# Patient Record
Sex: MALE | Race: WHITE | NOT HISPANIC OR LATINO | URBAN - METROPOLITAN AREA
[De-identification: names, ages, dates, MRNs, and addresses within clinical notes are randomized per-mention and may not be internally consistent; named-entity substitution may affect disease eponyms.]

---

## 2017-01-20 ENCOUNTER — INPATIENT (INPATIENT)
Facility: HOSPITAL | Age: 72
LOS: 0 days | Discharge: HOME | End: 2017-01-20
Attending: EMERGENCY MEDICINE

## 2017-06-27 DIAGNOSIS — R07.81 PLEURODYNIA: ICD-10-CM

## 2017-06-27 DIAGNOSIS — I10 ESSENTIAL (PRIMARY) HYPERTENSION: ICD-10-CM

## 2017-06-27 DIAGNOSIS — E78.5 HYPERLIPIDEMIA, UNSPECIFIED: ICD-10-CM

## 2017-06-27 DIAGNOSIS — R10.9 UNSPECIFIED ABDOMINAL PAIN: ICD-10-CM

## 2018-02-01 ENCOUNTER — EMERGENCY (EMERGENCY)
Facility: HOSPITAL | Age: 73
LOS: 0 days | Discharge: HOME | End: 2018-02-01

## 2018-02-01 ENCOUNTER — INPATIENT (INPATIENT)
Facility: HOSPITAL | Age: 73
LOS: 0 days | Discharge: HOME | End: 2018-02-02
Attending: UROLOGY

## 2018-02-01 DIAGNOSIS — R10.9 UNSPECIFIED ABDOMINAL PAIN: ICD-10-CM

## 2018-02-01 DIAGNOSIS — N20.0 CALCULUS OF KIDNEY: ICD-10-CM

## 2018-02-04 DIAGNOSIS — I10 ESSENTIAL (PRIMARY) HYPERTENSION: ICD-10-CM

## 2018-02-04 DIAGNOSIS — E78.5 HYPERLIPIDEMIA, UNSPECIFIED: ICD-10-CM

## 2018-02-04 DIAGNOSIS — N20.1 CALCULUS OF URETER: ICD-10-CM

## 2018-02-06 PROBLEM — Z00.00 ENCOUNTER FOR PREVENTIVE HEALTH EXAMINATION: Status: ACTIVE | Noted: 2018-02-06

## 2018-02-07 ENCOUNTER — APPOINTMENT (OUTPATIENT)
Dept: UROLOGY | Facility: CLINIC | Age: 73
End: 2018-02-07
Payer: MEDICARE

## 2018-02-07 VITALS
BODY MASS INDEX: 26.48 KG/M2 | HEART RATE: 79 BPM | SYSTOLIC BLOOD PRESSURE: 171 MMHG | WEIGHT: 185 LBS | HEIGHT: 70 IN | DIASTOLIC BLOOD PRESSURE: 98 MMHG

## 2018-02-07 DIAGNOSIS — I10 ESSENTIAL (PRIMARY) HYPERTENSION: ICD-10-CM

## 2018-02-07 DIAGNOSIS — Z78.9 OTHER SPECIFIED HEALTH STATUS: ICD-10-CM

## 2018-02-07 DIAGNOSIS — N13.30 UNSPECIFIED HYDRONEPHROSIS: ICD-10-CM

## 2018-02-07 DIAGNOSIS — N23 UNSPECIFIED RENAL COLIC: ICD-10-CM

## 2018-02-07 DIAGNOSIS — Z87.442 PERSONAL HISTORY OF URINARY CALCULI: ICD-10-CM

## 2018-02-07 DIAGNOSIS — E78.00 PURE HYPERCHOLESTEROLEMIA, UNSPECIFIED: ICD-10-CM

## 2018-02-07 PROCEDURE — 99214 OFFICE O/P EST MOD 30 MIN: CPT

## 2018-02-07 RX ORDER — SIMVASTATIN 20 MG/1
20 TABLET, FILM COATED ORAL
Refills: 0 | Status: ACTIVE | COMMUNITY

## 2018-02-07 RX ORDER — LOSARTAN POTASSIUM 50 MG/1
50 TABLET, FILM COATED ORAL
Refills: 0 | Status: ACTIVE | COMMUNITY

## 2018-02-07 RX ORDER — SERTRALINE HYDROCHLORIDE 50 MG/1
50 TABLET, FILM COATED ORAL
Refills: 0 | Status: ACTIVE | COMMUNITY

## 2018-02-12 DIAGNOSIS — E78.5 HYPERLIPIDEMIA, UNSPECIFIED: ICD-10-CM

## 2018-02-12 DIAGNOSIS — Z87.442 PERSONAL HISTORY OF URINARY CALCULI: ICD-10-CM

## 2018-02-12 DIAGNOSIS — N20.0 CALCULUS OF KIDNEY: ICD-10-CM

## 2018-02-12 DIAGNOSIS — N13.2 HYDRONEPHROSIS WITH RENAL AND URETERAL CALCULOUS OBSTRUCTION: ICD-10-CM

## 2018-02-12 DIAGNOSIS — I10 ESSENTIAL (PRIMARY) HYPERTENSION: ICD-10-CM

## 2018-03-02 ENCOUNTER — OUTPATIENT (OUTPATIENT)
Dept: OUTPATIENT SERVICES | Facility: HOSPITAL | Age: 73
LOS: 1 days | Discharge: HOME | End: 2018-03-02

## 2018-03-02 VITALS
SYSTOLIC BLOOD PRESSURE: 130 MMHG | HEIGHT: 70 IN | TEMPERATURE: 88 F | HEART RATE: 76 BPM | RESPIRATION RATE: 16 BRPM | DIASTOLIC BLOOD PRESSURE: 84 MMHG | OXYGEN SATURATION: 98 % | WEIGHT: 184.97 LBS

## 2018-03-02 DIAGNOSIS — Z98.890 OTHER SPECIFIED POSTPROCEDURAL STATES: Chronic | ICD-10-CM

## 2018-03-02 DIAGNOSIS — R82.79 OTHER ABNORMAL FINDINGS ON MICROBIOLOGICAL EXAMINATION OF URINE: ICD-10-CM

## 2018-03-02 DIAGNOSIS — Z01.818 ENCOUNTER FOR OTHER PREPROCEDURAL EXAMINATION: ICD-10-CM

## 2018-03-02 DIAGNOSIS — N20.2 CALCULUS OF KIDNEY WITH CALCULUS OF URETER: ICD-10-CM

## 2018-03-02 DIAGNOSIS — D68.8 OTHER SPECIFIED COAGULATION DEFECTS: ICD-10-CM

## 2018-03-02 LAB
ALBUMIN SERPL ELPH-MCNC: 4.1 G/DL — SIGNIFICANT CHANGE UP (ref 3–5.5)
ALP SERPL-CCNC: 77 U/L — SIGNIFICANT CHANGE UP (ref 30–115)
ALT FLD-CCNC: 31 U/L — SIGNIFICANT CHANGE UP (ref 0–41)
ANION GAP SERPL CALC-SCNC: 7 MMOL/L — SIGNIFICANT CHANGE UP (ref 7–14)
APTT BLD: 28.6 SEC — SIGNIFICANT CHANGE UP (ref 27–39.2)
AST SERPL-CCNC: 27 U/L — SIGNIFICANT CHANGE UP (ref 0–41)
BASOPHILS # BLD AUTO: 0.04 K/UL — SIGNIFICANT CHANGE UP (ref 0–0.2)
BASOPHILS NFR BLD AUTO: 0.5 % — SIGNIFICANT CHANGE UP (ref 0–1)
BILIRUB SERPL-MCNC: 1 MG/DL — SIGNIFICANT CHANGE UP (ref 0.2–1.2)
BUN SERPL-MCNC: 25 MG/DL — HIGH (ref 10–20)
CALCIUM SERPL-MCNC: 9.3 MG/DL — SIGNIFICANT CHANGE UP (ref 8.5–10.1)
CHLORIDE SERPL-SCNC: 106 MMOL/L — SIGNIFICANT CHANGE UP (ref 98–110)
CO2 SERPL-SCNC: 24 MMOL/L — SIGNIFICANT CHANGE UP (ref 17–32)
CREAT SERPL-MCNC: 1.2 MG/DL — SIGNIFICANT CHANGE UP (ref 0.7–1.5)
EOSINOPHIL # BLD AUTO: 0.23 K/UL — SIGNIFICANT CHANGE UP (ref 0–0.7)
EOSINOPHIL NFR BLD AUTO: 3.1 % — SIGNIFICANT CHANGE UP (ref 0–8)
GLUCOSE SERPL-MCNC: 113 MG/DL — HIGH (ref 70–110)
HCT VFR BLD CALC: 43.6 % — SIGNIFICANT CHANGE UP (ref 42–52)
HGB BLD-MCNC: 14.8 G/DL — SIGNIFICANT CHANGE UP (ref 14–18)
IMM GRANULOCYTES NFR BLD AUTO: 0.4 % — HIGH (ref 0.1–0.3)
INR BLD: 1.06 RATIO — SIGNIFICANT CHANGE UP (ref 0.65–1.3)
LYMPHOCYTES # BLD AUTO: 1.66 K/UL — SIGNIFICANT CHANGE UP (ref 1.2–3.4)
LYMPHOCYTES # BLD AUTO: 22.6 % — SIGNIFICANT CHANGE UP (ref 20.5–51.1)
MCHC RBC-ENTMCNC: 30.7 PG — SIGNIFICANT CHANGE UP (ref 27–31)
MCHC RBC-ENTMCNC: 33.9 G/DL — SIGNIFICANT CHANGE UP (ref 32–37)
MCV RBC AUTO: 90.5 FL — SIGNIFICANT CHANGE UP (ref 80–94)
MONOCYTES # BLD AUTO: 0.66 K/UL — HIGH (ref 0.1–0.6)
MONOCYTES NFR BLD AUTO: 9 % — SIGNIFICANT CHANGE UP (ref 1.7–9.3)
NEUTROPHILS # BLD AUTO: 4.72 K/UL — SIGNIFICANT CHANGE UP (ref 1.4–6.5)
NEUTROPHILS NFR BLD AUTO: 64.4 % — SIGNIFICANT CHANGE UP (ref 42.2–75.2)
NRBC # BLD: 0 /100 WBCS — SIGNIFICANT CHANGE UP (ref 0–0)
PLATELET # BLD AUTO: 219 K/UL — SIGNIFICANT CHANGE UP (ref 130–400)
POTASSIUM SERPL-MCNC: 4.3 MMOL/L — SIGNIFICANT CHANGE UP (ref 3.5–5)
POTASSIUM SERPL-SCNC: 4.3 MMOL/L — SIGNIFICANT CHANGE UP (ref 3.5–5)
PROT SERPL-MCNC: 6.8 G/DL — SIGNIFICANT CHANGE UP (ref 6–8)
PROTHROM AB SERPL-ACNC: 11.5 SEC — SIGNIFICANT CHANGE UP (ref 9.95–12.87)
RBC # BLD: 4.82 M/UL — SIGNIFICANT CHANGE UP (ref 4.7–6.1)
RBC # FLD: 12.1 % — SIGNIFICANT CHANGE UP (ref 11.5–14.5)
SODIUM SERPL-SCNC: 137 MMOL/L — SIGNIFICANT CHANGE UP (ref 135–146)
WBC # BLD: 7.34 K/UL — SIGNIFICANT CHANGE UP (ref 4.8–10.8)
WBC # FLD AUTO: 7.34 K/UL — SIGNIFICANT CHANGE UP (ref 4.8–10.8)

## 2018-03-02 NOTE — H&P PST ADULT - PSH
H/O arthroscopy of shoulder  RIGHT  H/O parathyroidectomy    Status post laser lithotripsy of ureteral calculus

## 2018-03-02 NOTE — H&P PST ADULT - NSANTHOSAYNRD_GEN_A_CORE
No. JODIE screening performed.  STOP BANG Legend: 0-2 = LOW Risk; 3-4 = INTERMEDIATE Risk; 5-8 = HIGH Risk

## 2018-03-02 NOTE — H&P PST ADULT - FAMILY HISTORY
Mother  Still living? Unknown  Breast cancer, Age at diagnosis: Age Unknown     Father  Still living? Unknown  CAD (coronary artery disease), Age at diagnosis: Age Unknown

## 2018-03-02 NOTE — H&P PST ADULT - HISTORY OF PRESENT ILLNESS
71 Y/O MALE PRESENTS  TO PAST WITH HX  RENAL COLIC  PT NOW FOR SCHEDULED PROCEDURE. PT DENIES ANY CP SOB PALP COUGH DYSURIA FEVER URI. PT ABLE TO ERICK 1-2 FOS W/O SOB

## 2018-03-02 NOTE — H&P PST ADULT - PMH
Breast cancer    CAD (coronary artery disease) HTN (hypertension)    Hypercholesteremia    Renal colic

## 2018-03-03 LAB
CULTURE RESULTS: NO GROWTH — SIGNIFICANT CHANGE UP
SPECIMEN SOURCE: SIGNIFICANT CHANGE UP

## 2018-03-16 ENCOUNTER — OUTPATIENT (OUTPATIENT)
Dept: OUTPATIENT SERVICES | Facility: HOSPITAL | Age: 73
LOS: 1 days | Discharge: HOME | End: 2018-03-16

## 2018-03-16 ENCOUNTER — APPOINTMENT (OUTPATIENT)
Dept: UROLOGY | Facility: HOSPITAL | Age: 73
End: 2018-03-16
Payer: MEDICARE

## 2018-03-16 VITALS
DIASTOLIC BLOOD PRESSURE: 86 MMHG | WEIGHT: 184.97 LBS | SYSTOLIC BLOOD PRESSURE: 144 MMHG | HEART RATE: 81 BPM | HEIGHT: 70 IN | OXYGEN SATURATION: 93 % | RESPIRATION RATE: 17 BRPM | TEMPERATURE: 98 F

## 2018-03-16 VITALS — DIASTOLIC BLOOD PRESSURE: 92 MMHG | SYSTOLIC BLOOD PRESSURE: 140 MMHG | RESPIRATION RATE: 18 BRPM | HEART RATE: 80 BPM

## 2018-03-16 DIAGNOSIS — Z98.890 OTHER SPECIFIED POSTPROCEDURAL STATES: Chronic | ICD-10-CM

## 2018-03-16 PROCEDURE — 52356 CYSTO/URETERO W/LITHOTRIPSY: CPT | Mod: RT

## 2018-03-16 RX ORDER — HYDROMORPHONE HYDROCHLORIDE 2 MG/ML
0.5 INJECTION INTRAMUSCULAR; INTRAVENOUS; SUBCUTANEOUS
Qty: 0 | Refills: 0 | Status: DISCONTINUED | OUTPATIENT
Start: 2018-03-16 | End: 2018-03-16

## 2018-03-16 RX ORDER — PHENAZOPYRIDINE HCL 100 MG
200 TABLET ORAL ONCE
Qty: 0 | Refills: 0 | Status: DISCONTINUED | OUTPATIENT
Start: 2018-03-16 | End: 2018-03-31

## 2018-03-16 RX ORDER — ACETAMINOPHEN WITH CODEINE 300MG-30MG
1 TABLET ORAL
Qty: 12 | Refills: 0 | OUTPATIENT
Start: 2018-03-16 | End: 2018-03-18

## 2018-03-16 RX ORDER — ASPIRIN/CALCIUM CARB/MAGNESIUM 324 MG
1 TABLET ORAL
Qty: 0 | Refills: 0 | COMMUNITY

## 2018-03-16 RX ORDER — SODIUM CHLORIDE 9 MG/ML
1000 INJECTION, SOLUTION INTRAVENOUS
Qty: 0 | Refills: 0 | Status: DISCONTINUED | OUTPATIENT
Start: 2018-03-16 | End: 2018-03-31

## 2018-03-16 RX ORDER — ACETAMINOPHEN 500 MG
650 TABLET ORAL ONCE
Qty: 0 | Refills: 0 | Status: DISCONTINUED | OUTPATIENT
Start: 2018-03-16 | End: 2018-03-31

## 2018-03-16 RX ORDER — SIMVASTATIN 20 MG/1
1 TABLET, FILM COATED ORAL
Qty: 0 | Refills: 0 | COMMUNITY

## 2018-03-16 RX ORDER — LOSARTAN POTASSIUM 100 MG/1
1 TABLET, FILM COATED ORAL
Qty: 0 | Refills: 0 | COMMUNITY

## 2018-03-16 RX ORDER — TAMSULOSIN HYDROCHLORIDE 0.4 MG/1
1 CAPSULE ORAL
Qty: 0 | Refills: 0 | COMMUNITY

## 2018-03-16 RX ORDER — ONDANSETRON 8 MG/1
4 TABLET, FILM COATED ORAL ONCE
Qty: 0 | Refills: 0 | Status: DISCONTINUED | OUTPATIENT
Start: 2018-03-16 | End: 2018-03-31

## 2018-03-16 RX ORDER — OXYCODONE AND ACETAMINOPHEN 5; 325 MG/1; MG/1
1 TABLET ORAL ONCE
Qty: 0 | Refills: 0 | Status: DISCONTINUED | OUTPATIENT
Start: 2018-03-16 | End: 2018-03-16

## 2018-03-16 RX ORDER — ACETAMINOPHEN 500 MG
2 TABLET ORAL
Qty: 0 | Refills: 0 | COMMUNITY
Start: 2018-03-16

## 2018-03-16 RX ADMIN — SODIUM CHLORIDE 100 MILLILITER(S): 9 INJECTION, SOLUTION INTRAVENOUS at 12:54

## 2018-03-16 NOTE — ASU DISCHARGE PLAN (ADULT/PEDIATRIC). - MEDICATION SUMMARY - MEDICATIONS TO TAKE
I will START or STAY ON the medications listed below when I get home from the hospital:    acetaminophen 325 mg oral tablet  -- 2 tab(s) by mouth once, As needed, Mild Pain (1 - 3)  -- Indication: For Benign prostate hyperplasia    Multiple Vitamins oral capsule  -- 1 cap(s) by mouth once a day  -- Indication: For Benign prostate hyperplasia

## 2018-03-16 NOTE — BRIEF OPERATIVE NOTE - PROCEDURE
<<-----Click on this checkbox to enter Procedure Cystoscopic laser lithotripsy of ureteric calculus  03/16/2018    Active  [id number]

## 2018-03-19 ENCOUNTER — APPOINTMENT (OUTPATIENT)
Dept: UROLOGY | Facility: CLINIC | Age: 73
End: 2018-03-19
Payer: MEDICARE

## 2018-03-19 VITALS
SYSTOLIC BLOOD PRESSURE: 145 MMHG | BODY MASS INDEX: 26.48 KG/M2 | HEART RATE: 87 BPM | WEIGHT: 185 LBS | HEIGHT: 70 IN | DIASTOLIC BLOOD PRESSURE: 93 MMHG

## 2018-03-19 DIAGNOSIS — N20.1 CALCULUS OF URETER: ICD-10-CM

## 2018-03-19 PROCEDURE — 99214 OFFICE O/P EST MOD 30 MIN: CPT

## 2018-03-22 DIAGNOSIS — N20.2 CALCULUS OF KIDNEY WITH CALCULUS OF URETER: ICD-10-CM

## 2018-03-22 LAB — COMPN STONE: SIGNIFICANT CHANGE UP

## 2018-04-27 ENCOUNTER — OUTPATIENT (OUTPATIENT)
Dept: OUTPATIENT SERVICES | Facility: HOSPITAL | Age: 73
LOS: 1 days | Discharge: HOME | End: 2018-04-27

## 2018-04-27 ENCOUNTER — APPOINTMENT (OUTPATIENT)
Dept: UROLOGY | Facility: HOSPITAL | Age: 73
End: 2018-04-27
Payer: MEDICARE

## 2018-04-27 VITALS
RESPIRATION RATE: 18 BRPM | HEART RATE: 91 BPM | WEIGHT: 184.97 LBS | SYSTOLIC BLOOD PRESSURE: 170 MMHG | TEMPERATURE: 98 F | HEIGHT: 70 IN | OXYGEN SATURATION: 99 % | DIASTOLIC BLOOD PRESSURE: 91 MMHG

## 2018-04-27 VITALS — DIASTOLIC BLOOD PRESSURE: 100 MMHG | SYSTOLIC BLOOD PRESSURE: 170 MMHG | HEART RATE: 80 BPM | RESPIRATION RATE: 17 BRPM

## 2018-04-27 DIAGNOSIS — Z98.890 OTHER SPECIFIED POSTPROCEDURAL STATES: Chronic | ICD-10-CM

## 2018-04-27 DIAGNOSIS — N23 UNSPECIFIED RENAL COLIC: ICD-10-CM

## 2018-04-27 PROCEDURE — 52356 CYSTO/URETERO W/LITHOTRIPSY: CPT | Mod: LT

## 2018-04-27 RX ORDER — KETOROLAC TROMETHAMINE 30 MG/ML
30 SYRINGE (ML) INJECTION ONCE
Qty: 0 | Refills: 0 | Status: DISCONTINUED | OUTPATIENT
Start: 2018-04-27 | End: 2018-04-27

## 2018-04-27 RX ORDER — ACETAMINOPHEN WITH CODEINE 300MG-30MG
1 TABLET ORAL
Qty: 12 | Refills: 0 | OUTPATIENT
Start: 2018-04-27 | End: 2018-04-29

## 2018-04-27 RX ORDER — MORPHINE SULFATE 50 MG/1
2 CAPSULE, EXTENDED RELEASE ORAL
Qty: 0 | Refills: 0 | Status: DISCONTINUED | OUTPATIENT
Start: 2018-04-27 | End: 2018-04-27

## 2018-04-27 RX ORDER — ASPIRIN/CALCIUM CARB/MAGNESIUM 324 MG
1 TABLET ORAL
Qty: 0 | Refills: 0 | COMMUNITY

## 2018-04-27 RX ORDER — TAMSULOSIN HYDROCHLORIDE 0.4 MG/1
1 CAPSULE ORAL
Qty: 30 | Refills: 0 | OUTPATIENT
Start: 2018-04-27 | End: 2018-05-26

## 2018-04-27 RX ORDER — SODIUM CHLORIDE 9 MG/ML
1000 INJECTION, SOLUTION INTRAVENOUS
Qty: 0 | Refills: 0 | Status: DISCONTINUED | OUTPATIENT
Start: 2018-04-27 | End: 2018-05-12

## 2018-04-27 RX ORDER — OXYCODONE AND ACETAMINOPHEN 5; 325 MG/1; MG/1
2 TABLET ORAL ONCE
Qty: 0 | Refills: 0 | Status: DISCONTINUED | OUTPATIENT
Start: 2018-04-27 | End: 2018-04-27

## 2018-04-27 RX ORDER — ONDANSETRON 8 MG/1
4 TABLET, FILM COATED ORAL ONCE
Qty: 0 | Refills: 0 | Status: DISCONTINUED | OUTPATIENT
Start: 2018-04-27 | End: 2018-05-12

## 2018-04-27 RX ADMIN — SODIUM CHLORIDE 100 MILLILITER(S): 9 INJECTION, SOLUTION INTRAVENOUS at 10:26

## 2018-04-27 NOTE — BRIEF OPERATIVE NOTE - PROCEDURE
Cystoscopy with retrograde pyelography, and, if indicated, with any combination of ureteroscopy, holmium YAG laser lithotripsy, basket manipulation of calculus, or insertion of ureteral stent  04/27/2018    Active  [id number] <<-----Click on this checkbox to enter Procedure

## 2018-04-27 NOTE — ASU DISCHARGE PLAN (ADULT/PEDIATRIC). - MEDICATION SUMMARY - MEDICATIONS TO TAKE
I will START or STAY ON the medications listed below when I get home from the hospital:    acetaminophen 325 mg oral tablet  -- 2 tab(s) by mouth once, As needed, Mild Pain (1 - 3)  -- Indication: For Renal colic    losartan  -- Indication: For N20.2, 95754, 45081, 90786    simvastatin  -- Indication: For N20.2, 12310, 15126, 82507    Multiple Vitamins oral capsule  -- 1 cap(s) by mouth once a day  -- Indication: For N20.2, 65063, 03056, 88338

## 2018-04-27 NOTE — CHART NOTE - NSCHARTNOTEFT_GEN_A_CORE
PACU ANESTHESIA ADMISSION NOTE      Procedure:   Post op diagnosis:      ____  Intubated  TV:______       Rate: ______      FiO2: ______    _x___  Patent Airway    __x__  Full return of protective reflexes    x____  Full recovery from anesthesia / back to baseline     Vitals:   T: 98          R:   16               BP: 164/81                 Sat:   96                P: 69      Mental Status:  x____ Awake x  _____ Alert   _____ Drowsy   _____ Sedated    Nausea/Vomiting:  _x___ NO  ______Yesx,   See Post - Op Orders          Pain Scale (0-10):  ___o__    Treatment: ____ None    x____ See Post - Op/PCA Orders    Post - Operative Fluids:   ____ Oral   ____ See Post - Op Orders    Plan: Discharge:   _x___Home       _____Floor     _____Critical Care    _____  Other:_________________    Comments:

## 2018-04-27 NOTE — H&P PST ADULT - HISTORY OF PRESENT ILLNESS
Pt here for elective cystoscopy , left ureteroscopy , LL and Left stent placement.  Pt having no symptoms.  Denies NVD/ SOB/CP/ dsyuria

## 2018-05-02 ENCOUNTER — APPOINTMENT (OUTPATIENT)
Dept: UROLOGY | Facility: CLINIC | Age: 73
End: 2018-05-02
Payer: MEDICARE

## 2018-05-02 VITALS
WEIGHT: 185 LBS | HEART RATE: 76 BPM | BODY MASS INDEX: 26.48 KG/M2 | SYSTOLIC BLOOD PRESSURE: 122 MMHG | HEIGHT: 70 IN | DIASTOLIC BLOOD PRESSURE: 80 MMHG

## 2018-05-02 DIAGNOSIS — N20.0 CALCULUS OF KIDNEY: ICD-10-CM

## 2018-05-02 DIAGNOSIS — E78.00 PURE HYPERCHOLESTEROLEMIA, UNSPECIFIED: ICD-10-CM

## 2018-05-02 DIAGNOSIS — N40.1 BENIGN PROSTATIC HYPERPLASIA WITH LOWER URINARY TRACT SYMPTOMS: ICD-10-CM

## 2018-05-02 PROCEDURE — 99214 OFFICE O/P EST MOD 30 MIN: CPT

## 2018-05-04 ENCOUNTER — RX RENEWAL (OUTPATIENT)
Age: 73
End: 2018-05-04

## 2018-05-04 LAB — COMPN STONE: SIGNIFICANT CHANGE UP

## 2018-08-08 ENCOUNTER — APPOINTMENT (OUTPATIENT)
Dept: UROLOGY | Facility: CLINIC | Age: 73
End: 2018-08-08
Payer: MEDICARE

## 2018-08-08 VITALS
DIASTOLIC BLOOD PRESSURE: 76 MMHG | HEIGHT: 70 IN | HEART RATE: 92 BPM | BODY MASS INDEX: 26.48 KG/M2 | SYSTOLIC BLOOD PRESSURE: 120 MMHG | WEIGHT: 185 LBS

## 2018-08-08 DIAGNOSIS — Z71.89 OTHER SPECIFIED COUNSELING: ICD-10-CM

## 2018-08-08 PROBLEM — N23 UNSPECIFIED RENAL COLIC: Chronic | Status: ACTIVE | Noted: 2018-03-02

## 2018-08-08 PROBLEM — N40.0 BENIGN PROSTATIC HYPERPLASIA WITHOUT LOWER URINARY TRACT SYMPTOMS: Chronic | Status: ACTIVE | Noted: 2018-03-16

## 2018-08-08 PROBLEM — E78.00 PURE HYPERCHOLESTEROLEMIA, UNSPECIFIED: Chronic | Status: ACTIVE | Noted: 2018-03-02

## 2018-08-08 PROBLEM — I10 ESSENTIAL (PRIMARY) HYPERTENSION: Chronic | Status: ACTIVE | Noted: 2018-03-02

## 2018-08-08 PROCEDURE — 99213 OFFICE O/P EST LOW 20 MIN: CPT

## 2018-08-08 RX ORDER — ACETAMINOPHEN AND CODEINE 300; 30 MG/1; MG/1
300-30 TABLET ORAL
Qty: 14 | Refills: 0 | Status: COMPLETED | COMMUNITY
Start: 2018-02-02 | End: 2018-08-08

## 2018-08-08 RX ORDER — ASPIRIN 81 MG
81 TABLET, DELAYED RELEASE (ENTERIC COATED) ORAL
Refills: 0 | Status: ACTIVE | COMMUNITY

## 2018-08-08 RX ORDER — POTASSIUM CITRATE 10 MEQ/1
10 MEQ TABLET, EXTENDED RELEASE ORAL
Qty: 60 | Refills: 0 | Status: COMPLETED | COMMUNITY
Start: 2017-05-30 | End: 2018-08-08

## 2018-08-09 NOTE — ADDENDUM
[FreeTextEntry1] : I personally examined the patient and discussed the plan with the Physician Assistant/Nurse Practitioner at the time of the visit. I agree with the assessment and plan as written, unless noted below. \par

## 2018-08-09 NOTE — ASSESSMENT
[FreeTextEntry1] : JAISON GONZALES is a 73 year old male with a past medical history of nephrolithiasis and BPH with LUTS. Presents to the office today for a follow up, last seen on 5/2/2018 following a ureteroscopy for ureteral stones . Patient currently on Potassium Citrate 10 MEQ tabs 2 x a day for uric acid stones, no side effects reported. Denies hematuria, dysuria, and flank pain. No fever or chills. As per BPH with LUTS, patient currently on Tamsulosin 0.4 mg daily and FInasteride 5 mg daily, no side effects reported, patient on medication for approx 2 years. Patient had 24 hour urine done and is here to review results. \par \par 24 hour urine 7/7/2018\par -Urine Volume 1.02 L (> 2.5 L), SS CaOx 7.01 (6-10), Urine Calcium 110 (<250), Urine Oxalate 31 (20-40) ,Urine Citrate 517 L (>550) , pH 5.553 L (5.8-6.2), SS Uric Acid 1.66 H (0-1), Urine Uric Acid 0.405 (<0.800)

## 2018-08-09 NOTE — LETTER BODY
[Courtesy Letter:] : I had the pleasure of seeing your patient, [unfilled], in my office today. [Please see my note below.] : Please see my note below. [Consult Closing:] : Thank you very much for allowing me to participate in the care of this patient.  If you have any questions, please do not hesitate to contact me. [Sincerely,] : Sincerely, [FreeTextEntry2] : Dr. Astudillo [FreeTextEntry3] : Paco Nguyễn MD\par Director of Male Sexual Dysfunction and Urologic Prosthetics

## 2018-08-09 NOTE — HISTORY OF PRESENT ILLNESS
[None] : None [FreeTextEntry1] : NATALIIA GONZALES is a 73 year old male with a past medical history of nephrolithiasis and BPH with LUTS. Presents to the office today for a follow up, last seen on 5/2/2018 following a ureteroscopy for ureteral stones . Patient currently on Potassium Citrate 10 MEQ tabs 2 x a day for uric acid stones, no side effects reported. Denies hematuria, dysuria, and flank pain. No fever or chills. As per BPH with LUTS, patient currently on Tamsulosin 0.4 mg daily and FInasteride 5 mg daily, no side effects reported, patient on medication for approx 2 years. Patient had 24 hour urine done and is here to review results. \par \par 24 hour urine 7/7/2018\par -Urine Volume 1.02 L (> 2.5 L), SS CaOx 7.01 (6-10), Urine Calcium 110 (<250), Urine Oxalate 31 (20-40) ,Urine Citrate 517 L (>550) , pH 5.553 L (5.8-6.2), SS Uric Acid 1.66 H (0-1), Urine Uric Acid 0.405 (<0.800)

## 2019-01-06 ENCOUNTER — FORM ENCOUNTER (OUTPATIENT)
Age: 74
End: 2019-01-06

## 2019-01-07 ENCOUNTER — OUTPATIENT (OUTPATIENT)
Dept: OUTPATIENT SERVICES | Facility: HOSPITAL | Age: 74
LOS: 1 days | Discharge: HOME | End: 2019-01-07

## 2019-01-07 DIAGNOSIS — Z98.890 OTHER SPECIFIED POSTPROCEDURAL STATES: Chronic | ICD-10-CM

## 2019-01-07 DIAGNOSIS — N20.0 CALCULUS OF KIDNEY: ICD-10-CM

## 2019-02-27 ENCOUNTER — APPOINTMENT (OUTPATIENT)
Dept: UROLOGY | Facility: CLINIC | Age: 74
End: 2019-02-27
Payer: MEDICARE

## 2019-02-27 VITALS
HEIGHT: 70 IN | WEIGHT: 185 LBS | SYSTOLIC BLOOD PRESSURE: 168 MMHG | BODY MASS INDEX: 26.48 KG/M2 | HEART RATE: 84 BPM | DIASTOLIC BLOOD PRESSURE: 106 MMHG

## 2019-02-27 PROCEDURE — 99214 OFFICE O/P EST MOD 30 MIN: CPT

## 2019-02-27 NOTE — PHYSICAL EXAM
[General Appearance - Well Developed] : well developed [General Appearance - Well Nourished] : well nourished [Normal Appearance] : normal appearance [Well Groomed] : well groomed [General Appearance - In No Acute Distress] : no acute distress [Abdomen Soft] : soft [Abdomen Tenderness] : non-tender [Costovertebral Angle Tenderness] : no ~M costovertebral angle tenderness [Urethral Meatus] : meatus normal [FreeTextEntry1] : minimal left CVA tenderness [Skin Color & Pigmentation] : normal skin color and pigmentation [Edema] : no peripheral edema [] : no respiratory distress [Respiration, Rhythm And Depth] : normal respiratory rhythm and effort [Exaggerated Use Of Accessory Muscles For Inspiration] : no accessory muscle use [Oriented To Time, Place, And Person] : oriented to person, place, and time [Affect] : the affect was normal [Mood] : the mood was normal [Not Anxious] : not anxious [Normal Station and Gait] : the gait and station were normal for the patient's age [No Focal Deficits] : no focal deficits

## 2019-02-27 NOTE — REVIEW OF SYSTEMS
[see HPI] : see HPI [Dysuria] : no dysuria [Incontinence] : no incontinence [Nocturia] : no nocturia [Negative] : Endocrine

## 2019-02-27 NOTE — HISTORY OF PRESENT ILLNESS
[FreeTextEntry1] : JAISON GONZALES is a 73 year old male with a past medical history of nephrolithiasis and BPH with LUTS. Presents to the office today for a follow up.  history of ureteroscopy for ureteral stones. Patient currently on Potassium Citrate 10 MEQ tabs 2 x a day for uric acid stones, no side effects reported. Denies hematuria, dysuria, and flank pain. No fever or chills. As per BPH with LUTS, patient currently on Tamsulosin 0.4 mg daily and FInasteride 5 mg daily, no side effects reported, patient on medication for approx 2 years. \par \par 24 hour urine 7/7/2018\par -Urine Volume 1.02 L (> 2.5 L), SS CaOx 7.01 (6-10), Urine Calcium 110 (<250), Urine Oxalate 31 (20-40) Urine Citrate 517 L (>550) , pH 5.553 L (5.8-6.2), SS Uric Acid 1.66 H (0-1), Urine Uric Acid 0.405 (<0.800). \par \par US Renal - images reviewed by me --  Jan 2019 -- stones not visualized, bilateral renal cysts, with thin septations on the right

## 2019-03-18 ENCOUNTER — INPATIENT (INPATIENT)
Facility: HOSPITAL | Age: 74
LOS: 1 days | Discharge: HOME | End: 2019-03-20
Attending: HOSPITALIST | Admitting: HOSPITALIST

## 2019-03-18 VITALS
SYSTOLIC BLOOD PRESSURE: 142 MMHG | HEIGHT: 69 IN | RESPIRATION RATE: 18 BRPM | HEART RATE: 82 BPM | DIASTOLIC BLOOD PRESSURE: 102 MMHG | TEMPERATURE: 97 F | WEIGHT: 192.02 LBS | OXYGEN SATURATION: 100 %

## 2019-03-18 DIAGNOSIS — Z98.890 OTHER SPECIFIED POSTPROCEDURAL STATES: Chronic | ICD-10-CM

## 2019-03-18 LAB
ANION GAP SERPL CALC-SCNC: 11 MMOL/L — SIGNIFICANT CHANGE UP (ref 7–14)
BASOPHILS # BLD AUTO: 0.05 K/UL — SIGNIFICANT CHANGE UP (ref 0–0.2)
BASOPHILS NFR BLD AUTO: 0.6 % — SIGNIFICANT CHANGE UP (ref 0–1)
BUN SERPL-MCNC: 56 MG/DL — HIGH (ref 10–20)
CALCIUM SERPL-MCNC: 9.8 MG/DL — SIGNIFICANT CHANGE UP (ref 8.5–10.1)
CHLORIDE SERPL-SCNC: 99 MMOL/L — SIGNIFICANT CHANGE UP (ref 98–110)
CK MB CFR SERPL CALC: 2.6 NG/ML — SIGNIFICANT CHANGE UP (ref 0.6–6.3)
CK SERPL-CCNC: 113 U/L — SIGNIFICANT CHANGE UP (ref 0–225)
CO2 SERPL-SCNC: 27 MMOL/L — SIGNIFICANT CHANGE UP (ref 17–32)
CREAT SERPL-MCNC: 2.6 MG/DL — HIGH (ref 0.7–1.5)
EOSINOPHIL # BLD AUTO: 0.34 K/UL — SIGNIFICANT CHANGE UP (ref 0–0.7)
EOSINOPHIL NFR BLD AUTO: 4.2 % — SIGNIFICANT CHANGE UP (ref 0–8)
GLUCOSE SERPL-MCNC: 103 MG/DL — HIGH (ref 70–99)
HCT VFR BLD CALC: 49 % — SIGNIFICANT CHANGE UP (ref 42–52)
HGB BLD-MCNC: 16.4 G/DL — SIGNIFICANT CHANGE UP (ref 14–18)
IMM GRANULOCYTES NFR BLD AUTO: 0.2 % — SIGNIFICANT CHANGE UP (ref 0.1–0.3)
LACTATE SERPL-SCNC: 1 MMOL/L — SIGNIFICANT CHANGE UP (ref 0.5–2.2)
LIDOCAIN IGE QN: 66 U/L — HIGH (ref 7–60)
LYMPHOCYTES # BLD AUTO: 1.54 K/UL — SIGNIFICANT CHANGE UP (ref 1.2–3.4)
LYMPHOCYTES # BLD AUTO: 19 % — LOW (ref 20.5–51.1)
MAGNESIUM SERPL-MCNC: 2.2 MG/DL — SIGNIFICANT CHANGE UP (ref 1.8–2.4)
MCHC RBC-ENTMCNC: 30.8 PG — SIGNIFICANT CHANGE UP (ref 27–31)
MCHC RBC-ENTMCNC: 33.5 G/DL — SIGNIFICANT CHANGE UP (ref 32–37)
MCV RBC AUTO: 91.9 FL — SIGNIFICANT CHANGE UP (ref 80–94)
MONOCYTES # BLD AUTO: 0.7 K/UL — HIGH (ref 0.1–0.6)
MONOCYTES NFR BLD AUTO: 8.6 % — SIGNIFICANT CHANGE UP (ref 1.7–9.3)
NEUTROPHILS # BLD AUTO: 5.47 K/UL — SIGNIFICANT CHANGE UP (ref 1.4–6.5)
NEUTROPHILS NFR BLD AUTO: 67.4 % — SIGNIFICANT CHANGE UP (ref 42.2–75.2)
NRBC # BLD: 0 /100 WBCS — SIGNIFICANT CHANGE UP (ref 0–0)
PLATELET # BLD AUTO: 242 K/UL — SIGNIFICANT CHANGE UP (ref 130–400)
POTASSIUM SERPL-MCNC: 5.3 MMOL/L — HIGH (ref 3.5–5)
POTASSIUM SERPL-SCNC: 5.3 MMOL/L — HIGH (ref 3.5–5)
RBC # BLD: 5.33 M/UL — SIGNIFICANT CHANGE UP (ref 4.7–6.1)
RBC # FLD: 12.3 % — SIGNIFICANT CHANGE UP (ref 11.5–14.5)
SODIUM SERPL-SCNC: 137 MMOL/L — SIGNIFICANT CHANGE UP (ref 135–146)
TROPONIN T SERPL-MCNC: <0.01 NG/ML — SIGNIFICANT CHANGE UP
TROPONIN T SERPL-MCNC: <0.01 NG/ML — SIGNIFICANT CHANGE UP
WBC # BLD: 8.12 K/UL — SIGNIFICANT CHANGE UP (ref 4.8–10.8)
WBC # FLD AUTO: 8.12 K/UL — SIGNIFICANT CHANGE UP (ref 4.8–10.8)

## 2019-03-18 RX ORDER — FINASTERIDE 5 MG/1
1 TABLET, FILM COATED ORAL
Qty: 0 | Refills: 0 | COMMUNITY

## 2019-03-18 RX ORDER — SERTRALINE 25 MG/1
50 TABLET, FILM COATED ORAL DAILY
Qty: 0 | Refills: 0 | Status: DISCONTINUED | OUTPATIENT
Start: 2019-03-18 | End: 2019-03-20

## 2019-03-18 RX ORDER — LOSARTAN POTASSIUM 100 MG/1
0 TABLET, FILM COATED ORAL
Qty: 0 | Refills: 0 | COMMUNITY

## 2019-03-18 RX ORDER — SIMVASTATIN 20 MG/1
20 TABLET, FILM COATED ORAL AT BEDTIME
Qty: 0 | Refills: 0 | Status: DISCONTINUED | OUTPATIENT
Start: 2019-03-18 | End: 2019-03-20

## 2019-03-18 RX ORDER — SIMVASTATIN 20 MG/1
0 TABLET, FILM COATED ORAL
Qty: 0 | Refills: 0 | COMMUNITY

## 2019-03-18 RX ORDER — SODIUM CHLORIDE 9 MG/ML
1000 INJECTION INTRAMUSCULAR; INTRAVENOUS; SUBCUTANEOUS ONCE
Qty: 0 | Refills: 0 | Status: COMPLETED | OUTPATIENT
Start: 2019-03-18 | End: 2019-03-18

## 2019-03-18 RX ORDER — ASPIRIN/CALCIUM CARB/MAGNESIUM 324 MG
81 TABLET ORAL DAILY
Qty: 0 | Refills: 0 | Status: DISCONTINUED | OUTPATIENT
Start: 2019-03-18 | End: 2019-03-20

## 2019-03-18 RX ORDER — TAMSULOSIN HYDROCHLORIDE 0.4 MG/1
0.4 CAPSULE ORAL AT BEDTIME
Qty: 0 | Refills: 0 | Status: DISCONTINUED | OUTPATIENT
Start: 2019-03-18 | End: 2019-03-20

## 2019-03-18 RX ORDER — TAMSULOSIN HYDROCHLORIDE 0.4 MG/1
1 CAPSULE ORAL
Qty: 0 | Refills: 0 | COMMUNITY

## 2019-03-18 RX ORDER — INFLUENZA VIRUS VACCINE 15; 15; 15; 15 UG/.5ML; UG/.5ML; UG/.5ML; UG/.5ML
0.5 SUSPENSION INTRAMUSCULAR ONCE
Qty: 0 | Refills: 0 | Status: COMPLETED | OUTPATIENT
Start: 2019-03-18 | End: 2019-03-20

## 2019-03-18 RX ORDER — SODIUM CHLORIDE 9 MG/ML
1000 INJECTION INTRAMUSCULAR; INTRAVENOUS; SUBCUTANEOUS
Qty: 0 | Refills: 0 | Status: COMPLETED | OUTPATIENT
Start: 2019-03-18 | End: 2019-03-19

## 2019-03-18 RX ORDER — FINASTERIDE 5 MG/1
5 TABLET, FILM COATED ORAL DAILY
Qty: 0 | Refills: 0 | Status: DISCONTINUED | OUTPATIENT
Start: 2019-03-18 | End: 2019-03-20

## 2019-03-18 RX ORDER — SERTRALINE 25 MG/1
1 TABLET, FILM COATED ORAL
Qty: 0 | Refills: 0 | COMMUNITY

## 2019-03-18 RX ORDER — CHLORHEXIDINE GLUCONATE 213 G/1000ML
1 SOLUTION TOPICAL
Qty: 0 | Refills: 0 | Status: DISCONTINUED | OUTPATIENT
Start: 2019-03-18 | End: 2019-03-20

## 2019-03-18 RX ORDER — SIMVASTATIN 20 MG/1
1 TABLET, FILM COATED ORAL
Qty: 0 | Refills: 0 | COMMUNITY

## 2019-03-18 RX ADMIN — SODIUM CHLORIDE 75 MILLILITER(S): 9 INJECTION INTRAMUSCULAR; INTRAVENOUS; SUBCUTANEOUS at 20:50

## 2019-03-18 RX ADMIN — TAMSULOSIN HYDROCHLORIDE 0.4 MILLIGRAM(S): 0.4 CAPSULE ORAL at 22:14

## 2019-03-18 RX ADMIN — SODIUM CHLORIDE 75 MILLILITER(S): 9 INJECTION INTRAMUSCULAR; INTRAVENOUS; SUBCUTANEOUS at 22:15

## 2019-03-18 RX ADMIN — Medication 1 MILLIGRAM(S): at 18:19

## 2019-03-18 RX ADMIN — SODIUM CHLORIDE 1000 MILLILITER(S): 9 INJECTION INTRAMUSCULAR; INTRAVENOUS; SUBCUTANEOUS at 19:20

## 2019-03-18 RX ADMIN — SIMVASTATIN 20 MILLIGRAM(S): 20 TABLET, FILM COATED ORAL at 22:13

## 2019-03-18 NOTE — H&P ADULT - HISTORY OF PRESENT ILLNESS
[age]yo male presents to the ER with complaint of chest heaviness and shortness of breath with exertion attributing these symptoms, however, to like an "out of body" experience with anxiety. There was also period of vomiting with diarrhea. 73yo male presents to the ER with complaint of chest heaviness and shortness of breath with exertion attributing these symptoms, however, to like an "out of body" experience with anxiety. There was also period of vomiting with diarrhea a few days ago 73yo male presents to the ER with complaint of "body racing" and sensation of not being able to feel left shoulder area for last "couple of days". Adds that he has had episodes of chest heaviness with shortness of breath which is worse with exertion.  There was also period of vomiting with diarrhea last week and again this morning 75yo male presents to the ER with complaint of "body racing" and sensation of not being able to feel left shoulder area for last "couple of days". Adds that he has had episodes of chest heaviness with shortness of breath which is worse with exertion.  There was also period of vomiting with diarrhea last week and again this morning. He is urinating normally

## 2019-03-18 NOTE — ED PROVIDER NOTE - PHYSICAL EXAMINATION
VITAL SIGNS: AFebrile, vital signs stable  CONSTITUTIONAL: Well-developed; well-nourished; in no acute distress.  SKIN: Skin exam is warm and dry, no acute rash.  HEAD: Normocephalic; atraumatic.  EYES: Pupils equal round reactive to light, Extraocular movements intact; conjunctiva and sclera clear.  ENT: No nasal discharge; airway clear. Moist mucus membranes.  NECK: Supple; non tender. No rigidity  CARD: Regular rate and rhythm. Normal S1, S2; no murmurs, gallops, or rubs.  RESP: Lungs clear to auscultation bilaterally. No wheezes, rales or rhonchi.  ABD: Abdomen soft; non-tender; non-distended;  no hepatosplenomegaly. No costovertebral angle tenderness.   EXT: Normal ROM. No clubbing, cyanosis or edema. No calf tenderness to palpation.  NEURO: Alert and oriented x 3. No focal deficits.  PSYCH: Cooperative, very anxious appearing.

## 2019-03-18 NOTE — ED ADULT TRIAGE NOTE - CHIEF COMPLAINT QUOTE
"I have pain and numbness to my left shoulder and tightness to my chest. It's been on and off for the last several days."

## 2019-03-18 NOTE — ED PROVIDER NOTE - CARE PLAN
Principal Discharge DX:	LORNA (acute kidney injury)  Assessment and plan of treatment:	IVF,  trend bmp  Secondary Diagnosis:	Chest pressure

## 2019-03-18 NOTE — H&P ADULT - NSICDXPASTSURGICALHX_GEN_ALL_CORE_FT
PAST SURGICAL HISTORY:  H/O arthroscopy of shoulder RIGHT    H/O parathyroidectomy     Status post laser lithotripsy of ureteral calculus

## 2019-03-18 NOTE — H&P ADULT - NSICDXFAMILYHX_GEN_ALL_CORE_FT
FAMILY HISTORY:  Father  Still living? Unknown  CAD (coronary artery disease), Age at diagnosis: Age Unknown    Mother  Still living? Unknown  Breast cancer, Age at diagnosis: Age Unknown

## 2019-03-18 NOTE — ED PROVIDER NOTE - OBJECTIVE STATEMENT
75 yo M w h/o HTN, DL, BPH presents for intermittent chest tightness/pressure a/w nausea / L shoulder/arm numbness.    Today after walking,  pt started to have progressively worsening chest tightness / heaviness,  L arm numbness,  nausea, and palpitations that started several hours ptp. Pt states he has had intermittent episodes of these same symptoms over past few weeks, and states that he's also noticed progressively worsening sob on lighter exertion over the past several weeks as well.       Pt does endorse some vomiting / diarrhea x 1 day several days ago,  R shoulder pains (chronic,  to be worked up with shoulder MRI for further w/u as outpt), and intermittent mm cramps.     Pt denied any fevers/chills, uri, diaphoresis, dyspnea, le swelling.

## 2019-03-18 NOTE — ED PROVIDER NOTE - ATTENDING CONTRIBUTION TO CARE
Pt is a 75yo male on Sertraline 50mg who comes I nfor 1 week of feeling jittery with L shoulder numbness and chest discomfort.  No SOB or syncope.  Some nausea.  Pt visibly anxious and mildly tremulous.  Denies any smoking or alcohol use.  Smokes marijuana occasionally.    Exam: RRR, CTAB, 2+ radial pulses, no LE edema, no calf tenderness, normal nimo exam, no rash, no CVA tenderness, soft NT abdomen, no pulsatile mass  Imp: anxiety, r/o ACS  Plan: labs, ekg, xr, anxiolytic

## 2019-03-18 NOTE — H&P ADULT - ATTENDING COMMENTS
PROBLEM 1) Acute Kidney Injury - could be on basis of Pre-renal, will hydrate, hold ARB and ask renal to see                  2) Chest heaviness with exertional dyspnea - for now order serial cardiac enzymes and echo and start aspirin Consider cardiology consult depending on results                  3) HTN - monitor off ARB                  4) HLD - for now continue zocor                   5) on prostate meds, will continue for now PROBLEM 1) Acute Kidney Injury with hyperkalemia- could be on basis of Pre-renal, will hydrate, hold ARB and ask renal to see                  2) Chest heaviness with exertional dyspnea - for now order serial cardiac enzymes and echo and start aspirin Consider cardiology consult depending on results                  3) HTN - monitor off ARB                  4) HLD - for now continue zocor                   5) on prostate meds, will continue for now

## 2019-03-18 NOTE — ED PROVIDER NOTE - CLINICAL SUMMARY MEDICAL DECISION MAKING FREE TEXT BOX
pt with intermittent chest discomfort and shoulder numbness - ekg/trop normal but LORNA noted - recent diarrhea - admitted for IVF and serial ekg/trop

## 2019-03-18 NOTE — ED PROVIDER NOTE - PROGRESS NOTE DETAILS
Pt appears less anxious.  EKG without acute changes;  Trops negative x 1.  Labs revealing new LORNA,  so plan on admitting for LORNA + IVF.   Called Dr. Del Rosario,  who advised soft tele admission given chest pressure symptoms.  Called Dr. Busch, who approved for soft tele.

## 2019-03-18 NOTE — ED PROVIDER NOTE - NS ED ROS FT
Review of Systems:  •	CONSTITUTIONAL - No fever, No diaphoresis, No weight change  •	SKIN - No rash  •	HEMATOLOGIC - No abnormal bleeding or bruising  •	EYES - No eye pain, No blurred vision  •	ENT - No change in hearing, No sore throat, No neck pain, No rhinorrhea, No ear pain  •	RESPIRATORY - sob with exertion.  No cough  •	CARDIAC - + chest pressure/tightenss;  + palpaitions.   •	GI - No abdominal pain, + nausea without vomiting today;  No diarrhea, No constipation, No bright red blood per rectum or melena. No flank pain  •                 - No dysuria, frequency, hematuria.   •	ENDO - No polydypsia, No polyuria, No heat/cold intolerance  •	MUSCULOSKELETAL - No joint paint, No swelling, No back pain  •	NEUROLOGIC - + shoulder/L arm area intermittent numbness without mm weaknesses.  No focal weakness, No headache  All other systems negative, unless specified in HPI

## 2019-03-18 NOTE — H&P ADULT - NSHPLABSRESULTS_GEN_ALL_CORE
16.4   8.12  )-----------( 242      ( 18 Mar 2019 18:00 )             49.0     03-18    137  |  99  |  56<H>  ----------------------------<  103<H>  5.3<H>   |  27  |  2.6<H>    Ca    9.8      18 Mar 2019 18:00  Mg     2.2     03-18                Lactate Trend  03-18 @ 18:00 Lactate:1.0     CARDIAC MARKERS ( 18 Mar 2019 18:00 )  x     / <0.01 ng/mL / x     / x     / x          CAPILLARY BLOOD GLUCOSE 16.4   8.12  )-----------( 242      ( 18 Mar 2019 18:00 )             49.0     03-18    137  |  99  |  56<H>  ----------------------------<  103<H>  5.3<H>   |  27  |  2.6<H>    Ca    9.8      18 Mar 2019 18:00  Mg     2.2     03-18                Lactate Trend  03-18 @ 18:00 Lactate:1.0     CARDIAC MARKERS ( 18 Mar 2019 18:00 )  x     / <0.01 ng/mL / x     / x     / x          CAPILLARY BLOOD GLUCOSE    EKG - nsr, pac's

## 2019-03-19 LAB
ALBUMIN SERPL ELPH-MCNC: 3.9 G/DL — SIGNIFICANT CHANGE UP (ref 3.5–5.2)
ALP SERPL-CCNC: 86 U/L — SIGNIFICANT CHANGE UP (ref 30–115)
ALT FLD-CCNC: 20 U/L — SIGNIFICANT CHANGE UP (ref 0–41)
ANION GAP SERPL CALC-SCNC: 10 MMOL/L — SIGNIFICANT CHANGE UP (ref 7–14)
AST SERPL-CCNC: 18 U/L — SIGNIFICANT CHANGE UP (ref 0–41)
BILIRUB SERPL-MCNC: 0.9 MG/DL — SIGNIFICANT CHANGE UP (ref 0.2–1.2)
BUN SERPL-MCNC: 50 MG/DL — HIGH (ref 10–20)
CALCIUM SERPL-MCNC: 9.1 MG/DL — SIGNIFICANT CHANGE UP (ref 8.5–10.1)
CHLORIDE SERPL-SCNC: 104 MMOL/L — SIGNIFICANT CHANGE UP (ref 98–110)
CK MB CFR SERPL CALC: 2.1 NG/ML — SIGNIFICANT CHANGE UP (ref 0.6–6.3)
CK SERPL-CCNC: 94 U/L — SIGNIFICANT CHANGE UP (ref 0–225)
CO2 SERPL-SCNC: 23 MMOL/L — SIGNIFICANT CHANGE UP (ref 17–32)
CREAT SERPL-MCNC: 2.2 MG/DL — HIGH (ref 0.7–1.5)
GLUCOSE SERPL-MCNC: 106 MG/DL — HIGH (ref 70–99)
HCT VFR BLD CALC: 43.3 % — SIGNIFICANT CHANGE UP (ref 42–52)
HGB BLD-MCNC: 14.4 G/DL — SIGNIFICANT CHANGE UP (ref 14–18)
MAGNESIUM SERPL-MCNC: 2 MG/DL — SIGNIFICANT CHANGE UP (ref 1.8–2.4)
MCHC RBC-ENTMCNC: 30.7 PG — SIGNIFICANT CHANGE UP (ref 27–31)
MCHC RBC-ENTMCNC: 33.3 G/DL — SIGNIFICANT CHANGE UP (ref 32–37)
MCV RBC AUTO: 92.3 FL — SIGNIFICANT CHANGE UP (ref 80–94)
NRBC # BLD: 0 /100 WBCS — SIGNIFICANT CHANGE UP (ref 0–0)
PHOSPHATE SERPL-MCNC: 4.7 MG/DL — SIGNIFICANT CHANGE UP (ref 2.1–4.9)
PLATELET # BLD AUTO: 196 K/UL — SIGNIFICANT CHANGE UP (ref 130–400)
POTASSIUM SERPL-MCNC: 5.2 MMOL/L — HIGH (ref 3.5–5)
POTASSIUM SERPL-SCNC: 5.2 MMOL/L — HIGH (ref 3.5–5)
PROT SERPL-MCNC: 6.3 G/DL — SIGNIFICANT CHANGE UP (ref 6–8)
RBC # BLD: 4.69 M/UL — LOW (ref 4.7–6.1)
RBC # FLD: 12.3 % — SIGNIFICANT CHANGE UP (ref 11.5–14.5)
SODIUM SERPL-SCNC: 137 MMOL/L — SIGNIFICANT CHANGE UP (ref 135–146)
TROPONIN T SERPL-MCNC: <0.01 NG/ML — SIGNIFICANT CHANGE UP
WBC # BLD: 8.23 K/UL — SIGNIFICANT CHANGE UP (ref 4.8–10.8)
WBC # FLD AUTO: 8.23 K/UL — SIGNIFICANT CHANGE UP (ref 4.8–10.8)

## 2019-03-19 RX ORDER — CALCIUM CARBONATE 500(1250)
1 TABLET ORAL THREE TIMES A DAY
Qty: 0 | Refills: 0 | Status: DISCONTINUED | OUTPATIENT
Start: 2019-03-19 | End: 2019-03-20

## 2019-03-19 RX ORDER — SODIUM CHLORIDE 9 MG/ML
1000 INJECTION INTRAMUSCULAR; INTRAVENOUS; SUBCUTANEOUS
Qty: 0 | Refills: 0 | Status: DISCONTINUED | OUTPATIENT
Start: 2019-03-19 | End: 2019-03-20

## 2019-03-19 RX ADMIN — SIMVASTATIN 20 MILLIGRAM(S): 20 TABLET, FILM COATED ORAL at 21:09

## 2019-03-19 RX ADMIN — FINASTERIDE 5 MILLIGRAM(S): 5 TABLET, FILM COATED ORAL at 11:51

## 2019-03-19 RX ADMIN — Medication 81 MILLIGRAM(S): at 11:51

## 2019-03-19 RX ADMIN — Medication 30 MILLILITER(S): at 12:56

## 2019-03-19 RX ADMIN — TAMSULOSIN HYDROCHLORIDE 0.4 MILLIGRAM(S): 0.4 CAPSULE ORAL at 21:09

## 2019-03-19 RX ADMIN — SERTRALINE 50 MILLIGRAM(S): 25 TABLET, FILM COATED ORAL at 11:51

## 2019-03-19 RX ADMIN — SODIUM CHLORIDE 75 MILLILITER(S): 9 INJECTION INTRAMUSCULAR; INTRAVENOUS; SUBCUTANEOUS at 21:17

## 2019-03-19 NOTE — PROGRESS NOTE ADULT - SUBJECTIVE AND OBJECTIVE BOX
CHIEF COMPLAINT:    Patient is a 74y old  Male who presents with a chief complaint of LORNA (19 Mar 2019 09:14)      INTERVAL HPI/OVERNIGHT EVENTS:    Patient seen and examined at bedside. No acute overnight events occurred.    ROS: All other systems are negative.    Vital Signs:    T(F): 97.3 (03-19-19 @ 05:44), Max: 97.8 (03-18-19 @ 19:00)  HR: 83 (03-19-19 @ 05:44) (77 - 83)  BP: 137/86 (03-19-19 @ 05:44) (133/92 - 165/92)  RR: 18 (03-19-19 @ 05:44) (18 - 18)  SpO2: 98% (03-18-19 @ 19:00) (98% - 100%)  I&O's Summary    18 Mar 2019 07:01  -  19 Mar 2019 07:00  --------------------------------------------------------  IN: 150 mL / OUT: 500 mL / NET: -350 mL      Daily Height in cm: 177.8 (18 Mar 2019 22:00)    Daily   CAPILLARY BLOOD GLUCOSE          PHYSICAL EXAM:  GENERAL:  NAD  SKIN: No rashes or lesions  HEENT: Atraumatic. Normocephalic. Anicteric  NECK:  No JVD.   PULMONARY: Clear to ausculation bilaterally. No wheezing. No rales  CVS: Normal S1, S2. Regular rate and rhythm. No murmurs.  ABDOMEN/GI: Soft, Nontender, Nondistended; Bowel sounds are present  EXTREMITIES:  No edema B/L LE.  NEUROLOGIC:  No motor deficit.  PSYCH: Alert & oriented x 3, normal affect    Consultant(s) Notes Reviewed:  [x ] YES  [ ] NO  Care Discussed with Consultants/Other Providers [ x] YES  [ ] NO    LABS:                        14.4   8.23  )-----------( 196      ( 19 Mar 2019 06:32 )             43.3     03-19    137  |  104  |  50<H>  ----------------------------<  106<H>  5.2<H>   |  23  |  2.2<H>    Ca    9.1      19 Mar 2019 06:32  Phos  4.7     03-19  Mg     2.0     03-19    TPro  6.3  /  Alb  3.9  /  TBili  0.9  /  DBili  x   /  AST  18  /  ALT  20  /  AlkPhos  86  03-19        Trop <0.01, CKMB 2.1, CK 94, 03-19-19 @ 06:32  Trop <0.01, CKMB 2.6, , 03-18-19 @ 21:49  Trop <0.01, CKMB --, CK --, 03-18-19 @ 18:00        RADIOLOGY & ADDITIONAL TESTS:  Imaging or report Personally Reviewed:  [ ] YES  [ ] NO    EKG reviewed independently    Medications:  Standing  aspirin  chewable 81 milliGRAM(s) Oral daily  chlorhexidine 4% Liquid 1 Application(s) Topical <User Schedule>  finasteride 5 milliGRAM(s) Oral daily  influenza   Vaccine 0.5 milliLiter(s) IntraMuscular once  sertraline 50 milliGRAM(s) Oral daily  simvastatin 20 milliGRAM(s) Oral at bedtime  sodium chloride 0.9%. 1000 milliLiter(s) IV Continuous <Continuous>  tamsulosin 0.4 milliGRAM(s) Oral at bedtime    PRN Meds      Case discussed with resident  Care discussed with pt CHIEF COMPLAINT:    Patient is a 74y old  Male who presents with a chief complaint of weakness, dizziness.    INTERVAL HPI/OVERNIGHT EVENTS:    Patient seen and examined at bedside. No acute overnight events occurred.    ROS: Denies dizziness, chest pain, SOB. Mildly nauseous no vomiting. All other systems are negative.    Vital Signs:    T(F): 97.3 (03-19-19 @ 05:44), Max: 97.8 (03-18-19 @ 19:00)  HR: 83 (03-19-19 @ 05:44) (77 - 83)  BP: 137/86 (03-19-19 @ 05:44) (133/92 - 165/92)  RR: 18 (03-19-19 @ 05:44) (18 - 18)  SpO2: 98% (03-18-19 @ 19:00) (98% - 100%)      18 Mar 2019 07:01  -  19 Mar 2019 07:00  --------------------------------------------------------  IN: 150 mL / OUT: 500 mL / NET: -350 mL      PHYSICAL EXAM:  GENERAL:  NAD  SKIN: No rashes or lesions  HEENT: Atraumatic. Normocephalic. Anicteric  NECK:  No JVD.   PULMONARY: Clear to ausculation bilaterally. No wheezing. No rales  CVS: Normal S1, S2. Regular rate and rhythm. No murmurs.  ABDOMEN/GI: Soft, Nontender, Nondistended; Bowel sounds are present  EXTREMITIES:  No edema B/L LE.  NEUROLOGIC:  No motor deficit.  PSYCH: Alert & oriented x 3, normal affect    LABS:                        14.4   8.23  )-----------( 196      ( 19 Mar 2019 06:32 )             43.3     03-19    137  |  104  |  50<H>  ----------------------------<  106<H>  5.2<H>   |  23  |  2.2<H>    Ca    9.1      19 Mar 2019 06:32  Phos  4.7     03-19  Mg     2.0     03-19    TPro  6.3  /  Alb  3.9  /  TBili  0.9  /  DBili  x   /  AST  18  /  ALT  20  /  AlkPhos  86  03-19        Trop <0.01, CKMB 2.1, CK 94, 03-19-19 @ 06:32  Trop <0.01, CKMB 2.6, , 03-18-19 @ 21:49  Trop <0.01, CKMB --, CK --, 03-18-19 @ 18:00        RADIOLOGY & ADDITIONAL TESTS:  No new imaging    Medications:  Standing  aspirin  chewable 81 milliGRAM(s) Oral daily  chlorhexidine 4% Liquid 1 Application(s) Topical <User Schedule>  finasteride 5 milliGRAM(s) Oral daily  influenza   Vaccine 0.5 milliLiter(s) IntraMuscular once  sertraline 50 milliGRAM(s) Oral daily  simvastatin 20 milliGRAM(s) Oral at bedtime  sodium chloride 0.9%. 1000 milliLiter(s) IV Continuous <Continuous>  tamsulosin 0.4 milliGRAM(s) Oral at bedtime    PRN Meds

## 2019-03-19 NOTE — CONSULT NOTE ADULT - ASSESSMENT
74/M with Ko and mild hyperkalemia, HTN for >5 yrs, admitted with palipitations.    KO - prerenal, creat is trending down  - cont NS 75 cc/hr   - obtain UA, urine prot/creat ratio  - kidney sono in [facility] 2019 - no hydro, b/l cysts, stones not seen  - obtain old creat from pt's PMD if possible (Dr Baldemar Mireles)    Hyperkalemia - low 2 gr K diet  -off Ace/Arb      HTN - no need for meds now    Will follow   Thank you

## 2019-03-19 NOTE — CONSULT NOTE ADULT - SUBJECTIVE AND OBJECTIVE BOX
NEPHROLOGY CONSULTATION NOTE    Patient is a 74y Male whom presented to the hospital with a complaint of "body racing" and sensation of not being able to feel left shoulder area for last "couple of days". Adds that he has had episodes of chest heaviness with shortness of breath which is worse with exertion.  There was also period of vomiting with diarrhea last week.   Pt seen for LORNA and hyperkalemia. Pt was drinking energy gatorade drink but not much. He had recent BW by his PMD Dr Mcdermott in [facility] and was not told of any abnormal labs.    HAd kidney stones in the past 100% uric acid, no h/o gout.    PAST MEDICAL & SURGICAL HISTORY:  Benign prostate hyperplasia  Renal colic  Hypercholesteremia  HTN (hypertension)  H/O parathyroidectomy  Status post laser lithotripsy of ureteral calculus  H/O arthroscopy of shoulder: RIGHT    Allergies:  No Known Allergies    Home Medications Reviewed  Hospital Medications:   MEDICATIONS  (STANDING):  aspirin  chewable 81 milliGRAM(s) Oral daily  chlorhexidine 4% Liquid 1 Application(s) Topical <User Schedule>  finasteride 5 milliGRAM(s) Oral daily  influenza   Vaccine 0.5 milliLiter(s) IntraMuscular once  sertraline 50 milliGRAM(s) Oral daily  simvastatin 20 milliGRAM(s) Oral at bedtime  sodium chloride 0.9%. 1000 milliLiter(s) (75 mL/Hr) IV Continuous <Continuous>  tamsulosin 0.4 milliGRAM(s) Oral at bedtime      SOCIAL HISTORY:  Denies ETOH,Smoking,   FAMILY HISTORY:  CAD (coronary artery disease) (Father)  Breast cancer (Mother)        REVIEW OF SYSTEMS:  CONSTITUTIONAL: No weakness, fevers or chills  EYES/ENT: No visual changes;  No vertigo or throat pain   RESPIRATORY: No cough, wheezing, hemoptysis; No shortness of breath  CARDIOVASCULAR: No chest pain or palpitations.  GASTROINTESTINAL: No abdominal or epigastric pain. No nausea, vomiting, or hematemesis; No diarrhea or constipation. GENITOURINARY: No dysuria, frequency or hematuria  NEUROLOGICAL: No numbness or weakness  SKIN: No itching, burning, rashes, or lesions   VASCULAR: No bilateral lower extremity edema.   All other review of systems is negative unless indicated above.    VITALS:  T(F): 97.3 (03-19-19 @ 05:44), Max: 97.8 (03-18-19 @ 19:00)  HR: 83 (03-19-19 @ 05:44)  BP: 137/86 (03-19-19 @ 05:44)  RR: 18 (03-19-19 @ 05:44)  SpO2: 98% (03-18-19 @ 19:00)    03-18 @ 07:01  -  03-19 @ 07:00  --------------------------------------------------------  IN: 150 mL / OUT: 500 mL / NET: -350 mL      Height (cm): 177.8 (03-18 @ 22:00)  Weight (kg): 84.1 (03-18 @ 22:00)  BMI (kg/m2): 26.6 (03-18 @ 22:00)  BSA (m2): 2.02 (03-18 @ 22:00)      I&O's Detail    18 Mar 2019 07:01  -  19 Mar 2019 07:00  --------------------------------------------------------  IN:    sodium chloride 0.9%.: 150 mL  Total IN: 150 mL    OUT:    Voided: 500 mL  Total OUT: 500 mL    Total NET: -350 mL        Creatine Kinase, Serum: 94 U/L (03-19-19 @ 06:32)  Creatine Kinase, Serum: 113 U/L (03-18-19 @ 21:49)      PHYSICAL EXAM:  Constitutional: NAD  HEENT: anicteric sclera, MMM  Neck: No JVD  Respiratory: CTAB, no wheezes, rales or rhonchi  Cardiovascular: S1, S2, RRR  Gastrointestinal: BS+, soft, NT/ND  Extremities: No peripheral edema  Neurological: A/O x 3, no focal deficits  Psychiatric: Normal mood, normal affect  : No CVA tenderness. No cole.   Skin: No rashes  Vascular Access:    LABS:  03-19    137  |  104  |  50<H>  ----------------------------<  106<H>  5.2<H>   |  23  |  2.2<H>    Ca    9.1      19 Mar 2019 06:32  Phos  4.7     03-19  Mg     2.0     03-19    TPro  6.3  /  Alb  3.9  /  TBili  0.9  /  DBili      /  AST  18  /  ALT  20  /  AlkPhos  86  03-19    Creatinine Trend: 2.2 <--, 2.6 <--                        14.4   8.23  )-----------( 196      ( 19 Mar 2019 06:32 )             43.3     Urine Studies:              RADIOLOGY & ADDITIONAL STUDIES:    < from: US Retroperitoneal B-Scan Limited (01.07.19 @ 11:13) >  IMPRESSION:  No hydronephrosis in either kidney. Previously seen nonobstructing   bilateral renal stones on prior exam in February 2018 arenot well   visualized on current ultrasound.    Bilateral renal cysts, with thin septations on the right, stable.    < end of copied text >

## 2019-03-20 ENCOUNTER — TRANSCRIPTION ENCOUNTER (OUTPATIENT)
Age: 74
End: 2019-03-20

## 2019-03-20 VITALS
HEART RATE: 75 BPM | SYSTOLIC BLOOD PRESSURE: 138 MMHG | RESPIRATION RATE: 18 BRPM | DIASTOLIC BLOOD PRESSURE: 88 MMHG | TEMPERATURE: 97 F

## 2019-03-20 LAB
ANION GAP SERPL CALC-SCNC: 10 MMOL/L — SIGNIFICANT CHANGE UP (ref 7–14)
APPEARANCE UR: CLEAR — SIGNIFICANT CHANGE UP
BACTERIA # UR AUTO: ABNORMAL
BILIRUB UR-MCNC: NEGATIVE — SIGNIFICANT CHANGE UP
BUN SERPL-MCNC: 38 MG/DL — HIGH (ref 10–20)
CALCIUM SERPL-MCNC: 9.3 MG/DL — SIGNIFICANT CHANGE UP (ref 8.5–10.1)
CHLORIDE SERPL-SCNC: 104 MMOL/L — SIGNIFICANT CHANGE UP (ref 98–110)
CO2 SERPL-SCNC: 25 MMOL/L — SIGNIFICANT CHANGE UP (ref 17–32)
COLOR SPEC: YELLOW — SIGNIFICANT CHANGE UP
CREAT ?TM UR-MCNC: 95 MG/DL — SIGNIFICANT CHANGE UP
CREAT SERPL-MCNC: 1.7 MG/DL — HIGH (ref 0.7–1.5)
DIFF PNL FLD: ABNORMAL
GLUCOSE SERPL-MCNC: 111 MG/DL — HIGH (ref 70–99)
GLUCOSE UR QL: NEGATIVE MG/DL — SIGNIFICANT CHANGE UP
HCV AB S/CO SERPL IA: 0.11 S/CO — SIGNIFICANT CHANGE UP (ref 0–0.79)
HCV AB SERPL-IMP: SIGNIFICANT CHANGE UP
KETONES UR-MCNC: NEGATIVE — SIGNIFICANT CHANGE UP
LEUKOCYTE ESTERASE UR-ACNC: NEGATIVE — SIGNIFICANT CHANGE UP
MAGNESIUM SERPL-MCNC: 1.9 MG/DL — SIGNIFICANT CHANGE UP (ref 1.8–2.4)
NITRITE UR-MCNC: NEGATIVE — SIGNIFICANT CHANGE UP
PH UR: 5.5 — SIGNIFICANT CHANGE UP (ref 5–8)
POTASSIUM SERPL-MCNC: 5.1 MMOL/L — HIGH (ref 3.5–5)
POTASSIUM SERPL-SCNC: 5.1 MMOL/L — HIGH (ref 3.5–5)
PROT ?TM UR-MCNC: <5 MG/DLG/24H — SIGNIFICANT CHANGE UP
PROT UR-MCNC: NEGATIVE MG/DL — SIGNIFICANT CHANGE UP
PROT/CREAT UR-RTO: <0.1 RATIO — SIGNIFICANT CHANGE UP (ref 0–0.2)
RBC CASTS # UR COMP ASSIST: SIGNIFICANT CHANGE UP /HPF
SODIUM SERPL-SCNC: 139 MMOL/L — SIGNIFICANT CHANGE UP (ref 135–146)
SP GR SPEC: 1.02 — SIGNIFICANT CHANGE UP (ref 1.01–1.03)
UROBILINOGEN FLD QL: 0.2 MG/DL — SIGNIFICANT CHANGE UP (ref 0.2–0.2)
WBC UR QL: SIGNIFICANT CHANGE UP /HPF

## 2019-03-20 RX ORDER — AMLODIPINE BESYLATE 2.5 MG/1
1 TABLET ORAL
Qty: 14 | Refills: 0 | OUTPATIENT
Start: 2019-03-20 | End: 2019-04-02

## 2019-03-20 RX ORDER — LOSARTAN POTASSIUM 100 MG/1
1 TABLET, FILM COATED ORAL
Qty: 0 | Refills: 0 | COMMUNITY

## 2019-03-20 RX ADMIN — Medication 81 MILLIGRAM(S): at 11:21

## 2019-03-20 RX ADMIN — FINASTERIDE 5 MILLIGRAM(S): 5 TABLET, FILM COATED ORAL at 11:21

## 2019-03-20 RX ADMIN — INFLUENZA VIRUS VACCINE 0.5 MILLILITER(S): 15; 15; 15; 15 SUSPENSION INTRAMUSCULAR at 11:22

## 2019-03-20 RX ADMIN — SERTRALINE 50 MILLIGRAM(S): 25 TABLET, FILM COATED ORAL at 11:22

## 2019-03-20 NOTE — PROGRESS NOTE ADULT - SUBJECTIVE AND OBJECTIVE BOX
Patient is a 74y old  Male who presents with a chief complaint of LORNA (20 Mar 2019 08:49)      SUBJECTIVE / OVERNIGHT EVENTS:  no cp, sob, abd pain, fever    MEDICATIONS  (STANDING):  aspirin  chewable 81 milliGRAM(s) Oral daily  chlorhexidine 4% Liquid 1 Application(s) Topical <User Schedule>  finasteride 5 milliGRAM(s) Oral daily  influenza   Vaccine 0.5 milliLiter(s) IntraMuscular once  sertraline 50 milliGRAM(s) Oral daily  simvastatin 20 milliGRAM(s) Oral at bedtime  sodium chloride 0.9%. 1000 milliLiter(s) (75 mL/Hr) IV Continuous <Continuous>  tamsulosin 0.4 milliGRAM(s) Oral at bedtime    MEDICATIONS  (PRN):  aluminum hydroxide/magnesium hydroxide/simethicone Suspension 30 milliLiter(s) Oral every 6 hours PRN Dyspepsia  calcium carbonate    500 mG (Tums) Chewable 1 Tablet(s) Chew three times a day PRN Heartburn        CAPILLARY BLOOD GLUCOSE        I&O's Summary      PHYSICAL EXAM:  GENERAL: nad, a+o x3  EYES:  NECK:   CHEST/LUNG: ctab no w/r/r  HEART: rrr nom /g/r  ABDOMEN:soft nt nd   EXTREMITIES:    PSYCH:   NEUROLOGY:   SKIN:    LABS:                        14.4   8.23  )-----------( 196      ( 19 Mar 2019 06:32 )             43.3     03-20    139  |  104  |  38<H>  ----------------------------<  111<H>  5.1<H>   |  25  |  1.7<H>    Ca    9.3      20 Mar 2019 06:47  Phos  4.7     03-19  Mg     1.9     03-20    TPro  6.3  /  Alb  3.9  /  TBili  0.9  /  DBili  x   /  AST  18  /  ALT  20  /  AlkPhos  86  03-19      CARDIAC MARKERS ( 19 Mar 2019 06:32 )  x     / <0.01 ng/mL / 94 U/L / x     / 2.1 ng/mL  CARDIAC MARKERS ( 18 Mar 2019 21:49 )  x     / <0.01 ng/mL / 113 U/L / x     / 2.6 ng/mL  CARDIAC MARKERS ( 18 Mar 2019 18:00 )  x     / <0.01 ng/mL / x     / x     / x              RADIOLOGY & ADDITIONAL TESTS:    Imaging Personally Reviewed:  Yes    Consultant(s) Notes Reviewed:      Care Discussed with Consultants/Other Providers:    Assessment and Plan   PAST MEDICAL & SURGICAL HISTORY:  Benign prostate hyperplasia  Renal colic  Hypercholesteremia  HTN (hypertension)  Breast cancer  CAD (coronary artery disease)  H/O parathyroidectomy  Status post laser lithotripsy of ureteral calculus  H/O arthroscopy of shoulder: RIGHT

## 2019-03-20 NOTE — PROGRESS NOTE ADULT - SUBJECTIVE AND OBJECTIVE BOX
Nephrology progress note    Patient is seen and examined, events over the last 24 h noted .  No complaints. Follows low K diet  Allergies:  No Known Allergies    Hospital Medications:   MEDICATIONS  (STANDING):  aspirin  chewable 81 milliGRAM(s) Oral daily  chlorhexidine 4% Liquid 1 Application(s) Topical <User Schedule>  finasteride 5 milliGRAM(s) Oral daily  influenza   Vaccine 0.5 milliLiter(s) IntraMuscular once  sertraline 50 milliGRAM(s) Oral daily  simvastatin 20 milliGRAM(s) Oral at bedtime  sodium chloride 0.9%. 1000 milliLiter(s) (75 mL/Hr) IV Continuous <Continuous>  tamsulosin 0.4 milliGRAM(s) Oral at bedtime        VITALS:  T(F): 97.1 (03-20-19 @ 05:26), Max: 97.7 (03-19-19 @ 21:43)  HR: 75 (03-20-19 @ 05:26)  BP: 141/93 (03-20-19 @ 01:24)  RR: 18 (03-20-19 @ 05:26)  SpO2: --  Wt(kg): --    03-18 @ 07:01  -  03-19 @ 07:00  --------------------------------------------------------  IN: 150 mL / OUT: 500 mL / NET: -350 mL          PHYSICAL EXAM:  Constitutional: NAD  HEENT: anicteric sclera, oropharynx clear, MMM  Neck: No JVD  Respiratory: CTAB, no wheezes, rales or rhonchi  Cardiovascular: S1, S2, RRR  Gastrointestinal: BS+, soft, NT/ND  Extremities: No cyanosis or clubbing. No peripheral edema  Neurological: A/O x 3, no focal deficits  : No CVA tenderness. No cole.   Skin: No rashes  Vascular Access:    LABS:  03-20    139  |  104  |  38<H>  ----------------------------<  111<H>  5.1<H>   |  25  |  1.7<H>    Ca    9.3      20 Mar 2019 06:47  Phos  4.7     03-19  Mg     1.9     03-20    TPro  6.3  /  Alb  3.9  /  TBili  0.9  /  DBili      /  AST  18  /  ALT  20  /  AlkPhos  86  03-19                          14.4   8.23  )-----------( 196      ( 19 Mar 2019 06:32 )             43.3       Urine Studies:      RADIOLOGY & ADDITIONAL STUDIES:

## 2019-03-20 NOTE — DISCHARGE NOTE NURSING/CASE MANAGEMENT/SOCIAL WORK - NSDCDPATPORTLINK_GEN_ALL_CORE
You can access the [facility] Patient Portal, offered by [facility], by registering with the following website: [email or web address]

## 2019-03-20 NOTE — DISCHARGE NOTE PROVIDER - NSDCCPCAREPLAN_GEN_ALL_CORE_FT
PRINCIPAL DISCHARGE DIAGNOSIS  Diagnosis: LORNA (acute kidney injury)  Assessment and Plan of Treatment: PLEASE FOLLOW UP WITH DR. FERREIRA AS SOON AS POSSIBLE      SECONDARY DISCHARGE DIAGNOSES  Diagnosis: Chest pressure  Assessment and Plan of Treatment:

## 2019-03-20 NOTE — DISCHARGE NOTE PROVIDER - HOSPITAL COURSE
74M PMHx BPH, HLD, depression  presented with weakness, dizziness, vomiting. Found to have LORNA, suspected due to pre-renal. Started on IV fluids with improvement in dizziness. Vomiting had resolved on admission. His LORNA improved as well. ARB was held. He was seen by nephro, to start norvasc on d/c. He continued to improve and was d/c on 3/20 with plans to f/u pmd.        32 minutes spent on discharge planning.

## 2019-03-22 DIAGNOSIS — E87.5 HYPERKALEMIA: ICD-10-CM

## 2019-03-22 DIAGNOSIS — R07.89 OTHER CHEST PAIN: ICD-10-CM

## 2019-03-22 DIAGNOSIS — I25.10 ATHEROSCLEROTIC HEART DISEASE OF NATIVE CORONARY ARTERY WITHOUT ANGINA PECTORIS: ICD-10-CM

## 2019-03-22 DIAGNOSIS — E78.5 HYPERLIPIDEMIA, UNSPECIFIED: ICD-10-CM

## 2019-03-22 DIAGNOSIS — I10 ESSENTIAL (PRIMARY) HYPERTENSION: ICD-10-CM

## 2019-03-22 DIAGNOSIS — N40.0 BENIGN PROSTATIC HYPERPLASIA WITHOUT LOWER URINARY TRACT SYMPTOMS: ICD-10-CM

## 2019-03-22 DIAGNOSIS — N17.9 ACUTE KIDNEY FAILURE, UNSPECIFIED: ICD-10-CM

## 2019-03-22 DIAGNOSIS — E86.0 DEHYDRATION: ICD-10-CM

## 2019-06-12 ENCOUNTER — RX RENEWAL (OUTPATIENT)
Age: 74
End: 2019-06-12

## 2019-07-02 ENCOUNTER — EMERGENCY (EMERGENCY)
Facility: HOSPITAL | Age: 74
LOS: 0 days | Discharge: HOME | End: 2019-07-02
Attending: EMERGENCY MEDICINE | Admitting: EMERGENCY MEDICINE
Payer: MEDICARE

## 2019-07-02 VITALS
TEMPERATURE: 98 F | SYSTOLIC BLOOD PRESSURE: 193 MMHG | HEART RATE: 74 BPM | OXYGEN SATURATION: 94 % | RESPIRATION RATE: 19 BRPM | WEIGHT: 184.97 LBS | DIASTOLIC BLOOD PRESSURE: 105 MMHG
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
## 2019-07-02 DIAGNOSIS — Z98.890 OTHER SPECIFIED POSTPROCEDURAL STATES: Chronic | ICD-10-CM

## 2019-07-02 DIAGNOSIS — Z79.899 OTHER LONG TERM (CURRENT) DRUG THERAPY: ICD-10-CM

## 2019-07-02 DIAGNOSIS — S61.250A OPEN BITE OF RIGHT INDEX FINGER WITHOUT DAMAGE TO NAIL, INITIAL ENCOUNTER: ICD-10-CM

## 2019-07-02 DIAGNOSIS — I10 ESSENTIAL (PRIMARY) HYPERTENSION: ICD-10-CM

## 2019-07-02 DIAGNOSIS — N40.0 BENIGN PROSTATIC HYPERPLASIA WITHOUT LOWER URINARY TRACT SYMPTOMS: ICD-10-CM
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
## 2019-07-02 DIAGNOSIS — E78.00 PURE HYPERCHOLESTEROLEMIA, UNSPECIFIED: ICD-10-CM

## 2019-07-02 PROCEDURE — 99283 EMERGENCY DEPT VISIT LOW MDM: CPT

## 2019-07-02 RX ADMIN — Medication 1 TABLET(S): at 15:57

## 2019-07-02 NOTE — ED ADULT NURSE NOTE - NS ED NOTE ABUSE RESPONSE YN
"Anesthesia Post Evaluation    Patient: Kar Lee    Procedure(s) Performed: Procedure(s) (LRB):  COLONOSCOPY (N/A)    Final Anesthesia Type: general  Patient location during evaluation: GI PACU  Patient participation: Yes- Able to Participate  Level of consciousness: awake and alert  Post-procedure vital signs: reviewed and stable  Pain management: adequate  Airway patency: patent  PONV status at discharge: No PONV  Anesthetic complications: no      Cardiovascular status: blood pressure returned to baseline  Respiratory status: unassisted, spontaneous ventilation and room air  Hydration status: euvolemic          Visit Vitals  /76   Pulse (!) 55   Temp 36.8 °C (98.2 °F) (Temporal)   Resp 18   Ht 5' 7" (1.702 m)   Wt 68.9 kg (152 lb)   SpO2 100%   BMI 23.81 kg/m²       Pain/Jose Martin Score: No Data Recorded      " Yes

## 2019-07-02 NOTE — ED PROVIDER NOTE - NSFOLLOWUPINSTRUCTIONS_ED_ALL_ED_FT
Animal Bite    Animal bites can range from mild to serious. An animal bite can result in a scratch on the skin, a deep open cut, a puncture of the skin, a crush injury, or tearing away of the skin or a body part. Treatment includes wound care, updating your tetanus shot, and in some cases, administering a rabies vaccine. If you were prescribed an antibiotic, take it as told by your health care provider. Do not stop using the antibiotic even if your condition improves.      SEEK IMMEDIATE MEDICAL CARE IF YOU HAVE ANY OF THE FOLLOWING SYMPTOMS: red streaking away from the wound, fluid/blood/pus coming from the wound, fever or chills, trouble moving the injured area, numbness or tingling extending beyond the wound.

## 2019-07-02 NOTE — ED PROVIDER NOTE - ATTENDING CONTRIBUTION TO CARE
Pt with dog bite to finger.  Neg jasmyne ttp.  +swelling and redness.  NL rom/str.  NL sensation.  a/p:  Will start on abx and pt will f/u as outpt.

## 2019-07-02 NOTE — ED PROVIDER NOTE - MUSCULOSKELETAL MINIMAL EXAM
tenderness right 2nd finger, FROM, NV intact,. multiple abrasions and superficial healing laceration to right finger, NV intact

## 2019-07-02 NOTE — ED PROVIDER NOTE - CLINICAL SUMMARY MEDICAL DECISION MAKING FREE TEXT BOX
D/w pt tx and need for abx.  Pt refused rabies vaccination.  Patient was discharged from the ED.  Verbal instructions were given, including instructions to return to ED immediately for any new, worsening, or concerning symptoms. Patient endorsed understanding. Written discharge instructions additionally given, including follow-up plan.

## 2019-07-02 NOTE — ED PROVIDER NOTE - OBJECTIVE STATEMENT
Patient c/o dog bite to right 2nd finger 3 days ago. states dog up to date with shots, refused rabies tx. c/o increased swelling today, with slight redness.

## 2019-10-14 ENCOUNTER — APPOINTMENT (OUTPATIENT)
Dept: UROLOGY | Facility: CLINIC | Age: 74
End: 2019-10-14
Payer: MEDICARE

## 2019-10-14 PROCEDURE — 99213 OFFICE O/P EST LOW 20 MIN: CPT

## 2019-10-14 NOTE — PHYSICAL EXAM
[General Appearance - Well Nourished] : well nourished [General Appearance - Well Developed] : well developed [Normal Appearance] : normal appearance [Abdomen Soft] : soft [Well Groomed] : well groomed [General Appearance - In No Acute Distress] : no acute distress [Costovertebral Angle Tenderness] : no ~M costovertebral angle tenderness [Abdomen Tenderness] : non-tender [Skin Color & Pigmentation] : normal skin color and pigmentation [FreeTextEntry1] : minimal left CVA tenderness [Urethral Meatus] : meatus normal [] : no respiratory distress [Edema] : no peripheral edema [Respiration, Rhythm And Depth] : normal respiratory rhythm and effort [Exaggerated Use Of Accessory Muscles For Inspiration] : no accessory muscle use [Affect] : the affect was normal [Oriented To Time, Place, And Person] : oriented to person, place, and time [Mood] : the mood was normal [Normal Station and Gait] : the gait and station were normal for the patient's age [Not Anxious] : not anxious [No Focal Deficits] : no focal deficits

## 2019-10-14 NOTE — HISTORY OF PRESENT ILLNESS
[FreeTextEntry1] : NATALIIA GONZALES is a 73 year old male with a past medical history of nephrolithiasis and BPH with LUTS. history of ureteroscopy for ureteral stones. Patient currently on Potassium Citrate 10 MEQ tabs 2 x a day for uric acid stones, no side effects reported. Denies hematuria, dysuria, and flank pain. No fever or chills. As per BPH with LUTS, patient currently on Tamsulosin 0.4 mg daily and FInasteride 5 mg daily, no side effects reported, patient on medication for approx 2 years. \par \par 24 hour urine 7/7/2018\par -Urine Volume 1.02 L (> 2.5 L), SS CaOx 7.01 (6-10), Urine Calcium 110 (<250), Urine Oxalate 31 (20-40) Urine Citrate 517 L (>550) , pH 5.553 L (5.8-6.2), SS Uric Acid 1.66 H (0-1), Urine Uric Acid 0.405 (<0.800). \par \par US Renal - images reviewed by me -- Jan 2019 -- stones not visualized, bilateral renal cysts, with thin septations on the right. \par \par US Renal; was done about a month ago at PMD and was told that it was normal\par \par 24 hour- was not repeated

## 2020-05-08 NOTE — ASU DISCHARGE PLAN (ADULT/PEDIATRIC). - ACCOMPANYING ADULT'S SIGNATURE_______________________________________
Patient with chronic kidney disease stage III  Patient is being followed at [facility],  Continue with tacrolimus 1 mg twice daily   Statement Selected

## 2020-05-18 ENCOUNTER — RX RENEWAL (OUTPATIENT)
Age: 75
End: 2020-05-18

## 2020-05-19 NOTE — ED ADULT NURSE NOTE - NSIMPLEMENTINTERV_GEN_ALL_ED
93 Implemented All Universal Safety Interventions:  [place] to call system. Call bell, personal items and telephone within reach. Instruct patient to call for assistance. Room bathroom lighting operational. Non-slip footwear when patient is off stretcher. Physically safe environment: no spills, clutter or unnecessary equipment. Stretcher in lowest position, wheels locked, appropriate side rails in place.

## 2020-06-03 ENCOUNTER — APPOINTMENT (OUTPATIENT)
Dept: UROLOGY | Facility: CLINIC | Age: 75
End: 2020-06-03
Payer: MEDICARE

## 2020-06-03 VITALS — WEIGHT: 185 LBS | HEIGHT: 70 IN | BODY MASS INDEX: 26.48 KG/M2 | TEMPERATURE: 96.3 F

## 2020-06-03 DIAGNOSIS — N20.0 CALCULUS OF KIDNEY: ICD-10-CM

## 2020-06-03 PROCEDURE — 99213 OFFICE O/P EST LOW 20 MIN: CPT

## 2020-06-03 NOTE — REVIEW OF SYSTEMS
[Dysuria] : no dysuria [see HPI] : see HPI [Incontinence] : no incontinence [Nocturia] : no nocturia [Negative] : Endocrine

## 2020-06-03 NOTE — HISTORY OF PRESENT ILLNESS
[FreeTextEntry1] : NATALIIA GONZALES is a 73 year old male with a past medical history of nephrolithiasis and BPH with LUTS. history of ureteroscopy for ureteral stones. Patient currently on Potassium Citrate 10 MEQ tabs 2 x a day for uric acid stones, no side effects reported. Denies hematuria, dysuria, and flank pain. No fever or chills. As per BPH with LUTS, patient currently on Tamsulosin 0.4 mg daily and FInasteride 5 mg daily, no side effects reported, patient on medication for approx 2 years. \par \par 24 hour urine 7/7/2018\par -Urine Volume 1.02 L (> 2.5 L), SS CaOx 7.01 (6-10), Urine Calcium 110 (<250), Urine Oxalate 31 (20-40) Urine Citrate 517 L (>550) , pH 5.553 L (5.8-6.2), SS Uric Acid 1.66 H (0-1), Urine Uric Acid 0.405 (<0.800). \par \par US Renal - images reviewed by me -- Jan 2019 -- stones not visualized, bilateral renal cysts, with thin septations on the right. \par \par US Renal; was done about a month ago at PMD and was told that it was normal\par \par Oct 2019\par 24 hour urine\par UOP 1.4L\par Caox 8.7\par ph low at 5.44, citrate mid 425\par  \par will continue to drink enough fluid to urinate at least 2.5L per day\par will decrease salt consumption\par will decrease animal protein consumption\par will let me know if develops flank pain with/without fevers  \par renal sonogram Sept 2020 \par

## 2020-06-03 NOTE — PHYSICAL EXAM
[General Appearance - Well Nourished] : well nourished [General Appearance - Well Developed] : well developed [Normal Appearance] : normal appearance [Well Groomed] : well groomed [General Appearance - In No Acute Distress] : no acute distress [Abdomen Tenderness] : non-tender [Abdomen Soft] : soft [Costovertebral Angle Tenderness] : no ~M costovertebral angle tenderness [FreeTextEntry1] : minimal left CVA tenderness [Urethral Meatus] : meatus normal [Skin Color & Pigmentation] : normal skin color and pigmentation [] : no respiratory distress [Edema] : no peripheral edema [Respiration, Rhythm And Depth] : normal respiratory rhythm and effort [Oriented To Time, Place, And Person] : oriented to person, place, and time [Exaggerated Use Of Accessory Muscles For Inspiration] : no accessory muscle use [Mood] : the mood was normal [Affect] : the affect was normal [Normal Station and Gait] : the gait and station were normal for the patient's age [Not Anxious] : not anxious [No Focal Deficits] : no focal deficits

## 2020-06-24 ENCOUNTER — RX RENEWAL (OUTPATIENT)
Age: 75
End: 2020-06-24

## 2020-10-14 ENCOUNTER — APPOINTMENT (OUTPATIENT)
Dept: UROLOGY | Facility: CLINIC | Age: 75
End: 2020-10-14
Payer: MEDICARE

## 2020-10-14 VITALS — BODY MASS INDEX: 26.48 KG/M2 | WEIGHT: 185 LBS | TEMPERATURE: 98.1 F | HEIGHT: 70 IN

## 2020-10-14 PROCEDURE — 99214 OFFICE O/P EST MOD 30 MIN: CPT

## 2020-10-14 NOTE — REVIEW OF SYSTEMS
[see HPI] : see HPI [Dysuria] : no dysuria [Incontinence] : no incontinence [Nocturia] : no nocturia [Negative] : Psychiatric

## 2020-10-14 NOTE — HISTORY OF PRESENT ILLNESS
[FreeTextEntry1] : NATALIIA GONZALES is a 73 year old male with a past medical history of nephrolithiasis and BPH with LUTS. history of ureteroscopy for ureteral stones. Patient currently on Potassium Citrate 10 MEQ tabs 2 x a day for uric acid stones, no side effects reported. Denies hematuria, dysuria, and flank pain. No fever or chills. As per BPH with LUTS, patient currently on Tamsulosin 0.4 mg daily and FInasteride 5 mg daily, no side effects reported, patient on medication for approx 2 years. \par \par 24 hour urine 7/7/2018\par -Urine Volume 1.02 L (> 2.5 L), SS CaOx 7.01 (6-10), Urine Calcium 110 (<250), Urine Oxalate 31 (20-40) Urine Citrate 517 L (>550) , pH 5.553 L (5.8-6.2), SS Uric Acid 1.66 H (0-1), Urine Uric Acid 0.405 (<0.800). \par \par US Renal - images reviewed by me -- Jan 2019 -- stones not visualized, bilateral renal cysts, with thin septations on the right. \par \par Oct 2019\par 24 hour urine\par UOP 1.4L\par Caox 8.7\par ph low at 5.44, citrate mid 425\par \par  on sildenafil for ED - has not yet tried\par \par outside medical records reviewed: \par PSA on June 2020--- 0.7\par US Renal; was done about a month ago at PMD - bilateral renal cysts

## 2020-10-14 NOTE — PHYSICAL EXAM
[General Appearance - Well Developed] : well developed [Normal Appearance] : normal appearance [General Appearance - Well Nourished] : well nourished [General Appearance - In No Acute Distress] : no acute distress [Well Groomed] : well groomed [Abdomen Soft] : soft [Abdomen Tenderness] : non-tender [FreeTextEntry1] : minimal left CVA tenderness [Urethral Meatus] : meatus normal [Costovertebral Angle Tenderness] : no ~M costovertebral angle tenderness [Edema] : no peripheral edema [Skin Color & Pigmentation] : normal skin color and pigmentation [Exaggerated Use Of Accessory Muscles For Inspiration] : no accessory muscle use [Respiration, Rhythm And Depth] : normal respiratory rhythm and effort [] : no respiratory distress [Oriented To Time, Place, And Person] : oriented to person, place, and time [Affect] : the affect was normal [Not Anxious] : not anxious [Mood] : the mood was normal [Normal Station and Gait] : the gait and station were normal for the patient's age [No Focal Deficits] : no focal deficits

## 2021-01-25 ENCOUNTER — APPOINTMENT (OUTPATIENT)
Dept: UROLOGY | Facility: CLINIC | Age: 76
End: 2021-01-25
Payer: MEDICARE

## 2021-01-25 PROCEDURE — 99213 OFFICE O/P EST LOW 20 MIN: CPT

## 2021-01-25 NOTE — ASSESSMENT
[FreeTextEntry1] : NATALIIA GONZALES is a 75 year old male with a past medical history of nephrolithiasis and BPH with LUTS. history of ureteroscopy for ureteral stones. Patient currently on Potassium Citrate 10 MEQ tabs 2 x a day for uric acid stones, no side effects reported. Denies hematuria, dysuria, and flank pain. No fever or chills. As per BPH with LUTS, patient currently on Tamsulosin 0.4 mg daily and FInasteride 5 mg daily, no side effects reported, patient on medication for approx 2 years. \par \par 24 hour urine 7/7/2018\par -Urine Volume 1.02 L (> 2.5 L), SS CaOx 7.01 (6-10), Urine Calcium 110 (<250), Urine Oxalate 31 (20-40) Urine Citrate 517 L (>550) , pH 5.553 L (5.8-6.2), SS Uric Acid 1.66 H (0-1), Urine Uric Acid 0.405 (<0.800). \par \par US Renal - images reviewed by me -- Jan 2019 -- stones not visualized, bilateral renal cysts, with thin septations on the right. \par \par Oct 2019\par 24 hour urine\par UOP 1.4L\par Caox 8.7\par ph low at 5.44, citrate mid 425\par \par  on sildenafil for ED - has not yet tried\par \par outside medical records reviewed: \par PSA on June 2020--- 0.7\par July 2020-- PMD sonogram --US Renal; was done about a month ago at PMD - bilateral renal cysts. \par \par \par will continue to drink enough fluid to urinate at least 2.5L per day\par will decrease salt consumption\par will decrease animal protein consumption\par will let me know if develops flank pain with/without fevers

## 2021-04-23 ENCOUNTER — RX RENEWAL (OUTPATIENT)
Age: 76
End: 2021-04-23

## 2021-05-15 ENCOUNTER — RX RENEWAL (OUTPATIENT)
Age: 76
End: 2021-05-15

## 2022-01-26 ENCOUNTER — APPOINTMENT (OUTPATIENT)
Dept: UROLOGY | Facility: CLINIC | Age: 77
End: 2022-01-26
Payer: MEDICARE

## 2022-01-26 VITALS — HEIGHT: 70 IN | WEIGHT: 185 LBS | BODY MASS INDEX: 26.48 KG/M2

## 2022-01-26 DIAGNOSIS — N28.1 CYST OF KIDNEY, ACQUIRED: ICD-10-CM

## 2022-01-26 PROCEDURE — 99213 OFFICE O/P EST LOW 20 MIN: CPT

## 2022-01-26 RX ORDER — SILDENAFIL 20 MG/1
20 TABLET ORAL
Qty: 90 | Refills: 5 | Status: ACTIVE | COMMUNITY
Start: 2020-06-03 | End: 1900-01-01

## 2022-01-26 NOTE — ASSESSMENT
[FreeTextEntry1] : 76 year old male with history of BPH, Kidney Stones, and Erectile Dysfunction.\par \par Kidney stones- No recent flank pain. Continue Potassium Citrate. \par \par BPH and LUTS- Stable. Continue Tamsulosin and Finasteride. \par \par Erectile Dysfunction- Trial Sildenafil 20 mg 1-5 tablets. Side effects reviewed. \par \par

## 2022-01-26 NOTE — HISTORY OF PRESENT ILLNESS
[FreeTextEntry1] : 76 year old male with history of Kidney stones, BPH associated with LUTS, and Erectile Dysfunction. \par \par Patient has had ureteroscopy for ureteral stones, and is currently managed on Potassium Citrate 10 MEQ tabs 2 x a day for Uric Acid Stones, no side effects reported. Patient denies any recent flank pain. \par \par As for BPH and LUTS patient is currently managed on Tamsulosin 0.4 mg daily and Finasteride 5 mg daily. Patient reports good urinary flow. Denies dysuria and gross hematuria. Denies side effects. \par \par Patient reports worsening Erectile Dysfunction. Patient has not tried Sildenafil as prescribed in the past. Patient denies history of heart attack. Denies taking any nitroglycerine medication. Patient states he exercises regularly.\par \par PSA in 2020 was 0.7 ng/mL

## 2022-03-09 NOTE — DISCHARGE NOTE PROVIDER - NS AS DC PROVIDER CONTACT Y/N MULTI
Patient called, requested refill. Next Office Visit Date:  No future appointments.     AAMIR please review via [address]
Yes

## 2022-03-31 NOTE — H&P PST ADULT - PSYCHIATRIC
Affect and characteristics of appearance, verbalizations, behaviors are appropriate Initial (On Arrival) negative

## 2022-07-05 ENCOUNTER — RX RENEWAL (OUTPATIENT)
Age: 77
End: 2022-07-05

## 2022-07-10 NOTE — ASU PREOP CHECKLIST - LOOSE TEETH
F: none  E: replenish as appropriate  N: DASH    VTE Prophylaxis: Lovenox 40mg q12h  GI: not needed  C: Full Code  D: CCU-->[id number] no F: none  E: replenish as appropriate  N: DASH    VTE Prophylaxis: Lovenox 40mg q12h (weight based)  GI: not needed  C: Full Code  D: CCU-->5LACHMAN

## 2022-07-11 ENCOUNTER — RX RENEWAL (OUTPATIENT)
Age: 77
End: 2022-07-11

## 2022-11-14 NOTE — ED ADULT NURSE NOTE - AS SC BRADEN MOISTURE
[Educated Patient & Family about Diagnosis] : educated the patient and family about the diagnosis (4) rarely moist

## 2023-01-27 ENCOUNTER — APPOINTMENT (OUTPATIENT)
Dept: UROLOGY | Facility: CLINIC | Age: 78
End: 2023-01-27
Payer: MEDICARE

## 2023-01-27 VITALS
HEIGHT: 70 IN | HEART RATE: 73 BPM | RESPIRATION RATE: 18 BRPM | SYSTOLIC BLOOD PRESSURE: 128 MMHG | WEIGHT: 185 LBS | TEMPERATURE: 98.5 F | OXYGEN SATURATION: 98 % | BODY MASS INDEX: 26.48 KG/M2 | DIASTOLIC BLOOD PRESSURE: 87 MMHG

## 2023-01-27 DIAGNOSIS — N13.8 BENIGN PROSTATIC HYPERPLASIA WITH LOWER URINARY TRACT SYMPMS: ICD-10-CM

## 2023-01-27 DIAGNOSIS — Z87.442 PERSONAL HISTORY OF URINARY CALCULI: ICD-10-CM

## 2023-01-27 DIAGNOSIS — N52.01 ERECTILE DYSFUNCTION DUE TO ARTERIAL INSUFFICIENCY: ICD-10-CM

## 2023-01-27 DIAGNOSIS — N40.1 BENIGN PROSTATIC HYPERPLASIA WITH LOWER URINARY TRACT SYMPMS: ICD-10-CM

## 2023-01-27 PROCEDURE — 99213 OFFICE O/P EST LOW 20 MIN: CPT

## 2023-01-27 NOTE — HISTORY OF PRESENT ILLNESS
[FreeTextEntry1] : 77 year old male with history of Kidney stones, BPH associated with LUTS, and Erectile Dysfunction. \par \par Patient has had ureteroscopy for ureteral stones, and is currently managed on Potassium Citrate 10 MEQ tabs 2 x a day for Uric Acid Stones, no side effects reported. Patient denies any recent flank pain. \par \par As for BPH and LUTS patient is currently managed on Tamsulosin 0.4 mg daily and Finasteride 5 mg daily. Patient reports good urinary flow. Denies dysuria and gross hematuria. Denies side effects. \par \par Patient reports worsening Erectile Dysfunction. Patient has not tried Sildenafil as prescribed in the past. Patient denies history of heart attack. Denies taking any nitroglycerine medication. Patient states he exercises regularly.\par \par PSA in 2020 was 0.7 ng/mL \par

## 2023-07-10 RX ORDER — FINASTERIDE 5 MG/1
5 TABLET, FILM COATED ORAL
Qty: 90 | Refills: 3 | Status: ACTIVE | COMMUNITY
Start: 2020-05-18 | End: 1900-01-01

## 2023-07-25 RX ORDER — TAMSULOSIN HYDROCHLORIDE 0.4 MG/1
0.4 CAPSULE ORAL
Qty: 90 | Refills: 3 | Status: ACTIVE | COMMUNITY
Start: 2022-07-11 | End: 1900-01-01

## 2023-09-03 NOTE — PATIENT PROFILE ADULT - NSPROCHRONICPAIN_GEN_A_NUR
no
6 70-year-old male presents to the ED by EMS for evaluation.  Patient was called and has AMS.  As per wife patient was not acting right Saturday at 5 PM.  This morning he continued to not be responsive.  Patient's son called 911

## 2023-11-16 RX ORDER — POTASSIUM CITRATE 10 MEQ/1
10 MEQ TABLET, EXTENDED RELEASE ORAL
Qty: 120 | Refills: 5 | Status: ACTIVE | COMMUNITY
Start: 2018-05-04 | End: 1900-01-01

## 2024-11-14 NOTE — ED PROVIDER NOTE - ENMT NEGATIVE STATEMENT, MLM
Care plan initiated.   Ears: no ear pain and no hearing problems.Nose: no nasal congestion and no nasal drainage.Mouth/Throat: no dysphagia, no hoarseness and no throat pain.Neck: no lumps, no pain, no stiffness and no swollen glands.